# Patient Record
Sex: FEMALE | Race: WHITE | Employment: OTHER | URBAN - METROPOLITAN AREA
[De-identification: names, ages, dates, MRNs, and addresses within clinical notes are randomized per-mention and may not be internally consistent; named-entity substitution may affect disease eponyms.]

---

## 2019-08-28 PROBLEM — I63.9 CEREBROVASCULAR ACCIDENT (CVA), UNSPECIFIED MECHANISM (HCC): Status: ACTIVE | Noted: 2019-08-28

## 2019-08-28 PROCEDURE — 85613 RUSSELL VIPER VENOM DILUTED: CPT | Performed by: INTERNAL MEDICINE

## 2019-08-28 PROCEDURE — 85705 THROMBOPLASTIN INHIBITION: CPT | Performed by: INTERNAL MEDICINE

## 2019-08-28 PROCEDURE — 85610 PROTHROMBIN TIME: CPT | Performed by: INTERNAL MEDICINE

## 2019-08-28 PROCEDURE — 86803 HEPATITIS C AB TEST: CPT | Performed by: INTERNAL MEDICINE

## 2019-08-28 PROCEDURE — 86704 HEP B CORE ANTIBODY TOTAL: CPT | Performed by: INTERNAL MEDICINE

## 2019-08-28 PROCEDURE — 86705 HEP B CORE ANTIBODY IGM: CPT | Performed by: INTERNAL MEDICINE

## 2019-08-28 PROCEDURE — 85732 THROMBOPLASTIN TIME PARTIAL: CPT | Performed by: INTERNAL MEDICINE

## 2019-11-11 PROBLEM — M05.79 RHEUMATOID ARTHRITIS INVOLVING MULTIPLE SITES WITH POSITIVE RHEUMATOID FACTOR (HCC): Status: ACTIVE | Noted: 2019-11-11

## 2020-02-14 PROBLEM — Z87.59 HISTORY OF MISCARRIAGE: Status: ACTIVE | Noted: 2020-02-14

## 2020-02-14 PROBLEM — Z72.0 TOBACCO USE: Status: ACTIVE | Noted: 2020-02-14

## 2020-02-25 ENCOUNTER — APPOINTMENT (OUTPATIENT)
Dept: GENERAL RADIOLOGY | Facility: HOSPITAL | Age: 75
End: 2020-02-25
Attending: EMERGENCY MEDICINE
Payer: COMMERCIAL

## 2020-02-25 ENCOUNTER — HOSPITAL ENCOUNTER (EMERGENCY)
Facility: HOSPITAL | Age: 75
Discharge: HOME | End: 2020-02-25
Attending: EMERGENCY MEDICINE
Payer: COMMERCIAL

## 2020-02-25 ENCOUNTER — APPOINTMENT (OUTPATIENT)
Dept: CT IMAGING | Facility: HOSPITAL | Age: 75
End: 2020-02-25
Attending: EMERGENCY MEDICINE
Payer: COMMERCIAL

## 2020-02-25 VITALS
OXYGEN SATURATION: 95 % | TEMPERATURE: 98 F | RESPIRATION RATE: 18 BRPM | DIASTOLIC BLOOD PRESSURE: 85 MMHG | WEIGHT: 185 LBS | SYSTOLIC BLOOD PRESSURE: 176 MMHG | BODY MASS INDEX: 32.78 KG/M2 | HEIGHT: 63 IN | HEART RATE: 88 BPM

## 2020-02-25 DIAGNOSIS — R07.89 CHEST WALL PAIN: ICD-10-CM
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
## 2020-02-25 DIAGNOSIS — S80.812A ABRASION OF LEFT LOWER EXTREMITY, INITIAL ENCOUNTER: ICD-10-CM

## 2020-02-25 PROCEDURE — 71045 X-RAY EXAM CHEST 1 VIEW: CPT | Performed by: EMERGENCY MEDICINE

## 2020-02-25 PROCEDURE — 70450 CT HEAD/BRAIN W/O DYE: CPT | Performed by: EMERGENCY MEDICINE

## 2020-02-25 PROCEDURE — 99284 EMERGENCY DEPT VISIT MOD MDM: CPT | Performed by: EMERGENCY MEDICINE

## 2020-02-25 PROCEDURE — 73590 X-RAY EXAM OF LOWER LEG: CPT | Performed by: EMERGENCY MEDICINE

## 2020-02-25 PROCEDURE — 72125 CT NECK SPINE W/O DYE: CPT | Performed by: EMERGENCY MEDICINE

## 2020-02-25 RX ORDER — ACETAMINOPHEN 500 MG
1000 TABLET ORAL ONCE
Status: COMPLETED | OUTPATIENT
Start: 2020-02-25 | End: 2020-02-25

## 2020-02-25 RX ORDER — HYDROCODONE BITARTRATE AND ACETAMINOPHEN 5; 325 MG/1; MG/1
2 TABLET ORAL ONCE
Status: DISCONTINUED | OUTPATIENT
Start: 2020-02-25 | End: 2020-02-25
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
## 2020-02-28 PROBLEM — Z87.59 HISTORY OF MISCARRIAGE: Status: RESOLVED | Noted: 2020-02-14 | Resolved: 2020-02-28

## 2020-08-13 PROBLEM — G89.29 CHRONIC BILATERAL LOW BACK PAIN WITH BILATERAL SCIATICA: Status: ACTIVE | Noted: 2020-01-01

## 2020-08-13 PROBLEM — M54.41 CHRONIC BILATERAL LOW BACK PAIN WITH BILATERAL SCIATICA: Status: ACTIVE | Noted: 2020-01-01

## 2020-08-13 PROBLEM — M54.42 CHRONIC BILATERAL LOW BACK PAIN WITH BILATERAL SCIATICA: Status: ACTIVE | Noted: 2020-01-01

## 2020-08-13 PROBLEM — M48.062 NEUROGENIC CLAUDICATION DUE TO LUMBAR SPINAL STENOSIS: Status: ACTIVE | Noted: 2020-01-01

## 2020-08-28 PROBLEM — J44.9 CHRONIC OBSTRUCTIVE PULMONARY DISEASE, UNSPECIFIED COPD TYPE (HCC): Status: ACTIVE | Noted: 2020-01-01

## 2020-08-28 PROBLEM — I71.4 ABDOMINAL AORTIC ANEURYSM (AAA) WITHOUT RUPTURE (HCC): Status: ACTIVE | Noted: 2020-01-01

## 2020-08-28 PROBLEM — M47.812 CERVICAL SPONDYLOSIS: Status: ACTIVE | Noted: 2020-01-01

## 2020-08-28 PROBLEM — I71.40 ABDOMINAL AORTIC ANEURYSM (AAA) WITHOUT RUPTURE (HCC): Status: ACTIVE | Noted: 2020-01-01

## 2020-12-14 PROBLEM — I10 BENIGN ESSENTIAL HYPERTENSION: Status: ACTIVE | Noted: 2020-01-01

## 2020-12-14 PROBLEM — E78.2 MIXED HYPERLIPIDEMIA: Status: ACTIVE | Noted: 2018-07-16

## 2021-01-01 ENCOUNTER — APPOINTMENT (OUTPATIENT)
Dept: GENERAL RADIOLOGY | Facility: HOSPITAL | Age: 76
DRG: 682 | End: 2021-01-01
Attending: EMERGENCY MEDICINE
Payer: MEDICARE

## 2021-01-01 ENCOUNTER — APPOINTMENT (OUTPATIENT)
Dept: CT IMAGING | Facility: HOSPITAL | Age: 76
DRG: 377 | End: 2021-01-01
Attending: EMERGENCY MEDICINE
Payer: MEDICARE

## 2021-01-01 ENCOUNTER — HOSPITAL ENCOUNTER (EMERGENCY)
Facility: HOSPITAL | Age: 76
Discharge: HOME | End: 2021-01-01
Attending: EMERGENCY MEDICINE
Payer: MEDICARE

## 2021-01-01 ENCOUNTER — APPOINTMENT (OUTPATIENT)
Dept: GENERAL RADIOLOGY | Facility: HOSPITAL | Age: 76
DRG: 377 | End: 2021-01-01
Attending: INTERNAL MEDICINE
Payer: MEDICARE

## 2021-01-01 ENCOUNTER — HOSPITAL ENCOUNTER (OUTPATIENT)
Dept: CT IMAGING | Facility: HOSPITAL | Age: 76
Discharge: HOME | End: 2021-01-01
Attending: FAMILY MEDICINE
Payer: MEDICARE

## 2021-01-01 ENCOUNTER — APPOINTMENT (OUTPATIENT)
Dept: GENERAL RADIOLOGY | Facility: HOSPITAL | Age: 76
DRG: 377 | End: 2021-01-01
Attending: EMERGENCY MEDICINE
Payer: MEDICARE

## 2021-01-01 ENCOUNTER — TELEPHONE (OUTPATIENT)
Dept: CARDIAC REHAB | Facility: HOSPITAL | Age: 76
End: 2021-01-01

## 2021-01-01 ENCOUNTER — APPOINTMENT (OUTPATIENT)
Dept: CT IMAGING | Facility: HOSPITAL | Age: 76
DRG: 682 | End: 2021-01-01
Attending: EMERGENCY MEDICINE
Payer: MEDICARE

## 2021-01-01 ENCOUNTER — APPOINTMENT (OUTPATIENT)
Dept: CV DIAGNOSTICS | Facility: HOSPITAL | Age: 76
DRG: 682 | End: 2021-01-01
Attending: INTERNAL MEDICINE
Payer: MEDICARE

## 2021-01-01 ENCOUNTER — HOSPITAL ENCOUNTER (INPATIENT)
Facility: HOSPITAL | Age: 76
LOS: 10 days | Discharge: HOME HEALTH | DRG: 377 | End: 2021-01-01
Attending: EMERGENCY MEDICINE | Admitting: INTERNAL MEDICINE
Payer: MEDICARE

## 2021-01-01 ENCOUNTER — LAB ENCOUNTER (OUTPATIENT)
Dept: LAB | Facility: HOSPITAL | Age: 76
End: 2021-01-01
Attending: ORTHOPAEDIC SURGERY
Payer: MEDICARE

## 2021-01-01 ENCOUNTER — ANESTHESIA EVENT (OUTPATIENT)
Dept: ENDOSCOPY | Facility: HOSPITAL | Age: 76
DRG: 377 | End: 2021-01-01
Payer: MEDICARE

## 2021-01-01 ENCOUNTER — APPOINTMENT (OUTPATIENT)
Dept: GENERAL RADIOLOGY | Facility: HOSPITAL | Age: 76
End: 2021-01-01
Attending: EMERGENCY MEDICINE
Payer: MEDICARE

## 2021-01-01 ENCOUNTER — LAB REQUISITION (OUTPATIENT)
Dept: LAB | Facility: HOSPITAL | Age: 76
End: 2021-01-01
Payer: MEDICARE

## 2021-01-01 ENCOUNTER — HOSPITAL ENCOUNTER (OUTPATIENT)
Facility: HOSPITAL | Age: 76
Setting detail: OBSERVATION
Discharge: HOME | End: 2021-01-01
Attending: EMERGENCY MEDICINE | Admitting: HOSPITALIST
Payer: MEDICARE

## 2021-01-01 ENCOUNTER — APPOINTMENT (OUTPATIENT)
Dept: MRI IMAGING | Facility: HOSPITAL | Age: 76
DRG: 377 | End: 2021-01-01
Attending: INTERNAL MEDICINE
Payer: MEDICARE

## 2021-01-01 ENCOUNTER — APPOINTMENT (OUTPATIENT)
Dept: CV DIAGNOSTICS | Facility: HOSPITAL | Age: 76
DRG: 377 | End: 2021-01-01
Attending: PHYSICIAN ASSISTANT
Payer: MEDICARE

## 2021-01-01 ENCOUNTER — HOSPITAL ENCOUNTER (INPATIENT)
Facility: HOSPITAL | Age: 76
LOS: 4 days | Discharge: HOME HEALTH | DRG: 682 | End: 2021-01-01
Attending: EMERGENCY MEDICINE | Admitting: INTERNAL MEDICINE
Payer: MEDICARE

## 2021-01-01 ENCOUNTER — APPOINTMENT (OUTPATIENT)
Dept: CT IMAGING | Facility: HOSPITAL | Age: 76
End: 2021-01-01
Attending: EMERGENCY MEDICINE
Payer: MEDICARE

## 2021-01-01 ENCOUNTER — HOSPITAL ENCOUNTER (INPATIENT)
Facility: HOSPITAL | Age: 76
LOS: 6 days | Discharge: HOSPICE | DRG: 682 | End: 2021-01-01
Attending: EMERGENCY MEDICINE | Admitting: INTERNAL MEDICINE
Payer: MEDICARE

## 2021-01-01 ENCOUNTER — APPOINTMENT (OUTPATIENT)
Dept: ULTRASOUND IMAGING | Facility: HOSPITAL | Age: 76
DRG: 377 | End: 2021-01-01
Attending: INTERNAL MEDICINE
Payer: MEDICARE

## 2021-01-01 ENCOUNTER — APPOINTMENT (OUTPATIENT)
Dept: ULTRASOUND IMAGING | Facility: HOSPITAL | Age: 76
DRG: 682 | End: 2021-01-01
Attending: HOSPITALIST
Payer: MEDICARE

## 2021-01-01 ENCOUNTER — APPOINTMENT (OUTPATIENT)
Dept: INTERVENTIONAL RADIOLOGY/VASCULAR | Facility: HOSPITAL | Age: 76
DRG: 377 | End: 2021-01-01
Attending: INTERNAL MEDICINE
Payer: MEDICARE

## 2021-01-01 ENCOUNTER — APPOINTMENT (OUTPATIENT)
Dept: MRI IMAGING | Facility: HOSPITAL | Age: 76
End: 2021-01-01
Attending: ORTHOPAEDIC SURGERY
Payer: MEDICARE

## 2021-01-01 ENCOUNTER — LAB ENCOUNTER (OUTPATIENT)
Dept: LAB | Facility: HOSPITAL | Age: 76
End: 2021-01-01
Attending: FAMILY MEDICINE
Payer: MEDICARE

## 2021-01-01 ENCOUNTER — ANESTHESIA (OUTPATIENT)
Dept: ENDOSCOPY | Facility: HOSPITAL | Age: 76
DRG: 377 | End: 2021-01-01
Payer: MEDICARE

## 2021-01-01 VITALS
OXYGEN SATURATION: 95 % | DIASTOLIC BLOOD PRESSURE: 57 MMHG | BODY MASS INDEX: 38.21 KG/M2 | SYSTOLIC BLOOD PRESSURE: 135 MMHG | TEMPERATURE: 97 F | WEIGHT: 207.63 LBS | RESPIRATION RATE: 16 BRPM | HEIGHT: 62 IN | HEART RATE: 95 BPM

## 2021-01-01 VITALS
SYSTOLIC BLOOD PRESSURE: 142 MMHG | OXYGEN SATURATION: 97 % | HEART RATE: 78 BPM | HEIGHT: 63 IN | WEIGHT: 175 LBS | BODY MASS INDEX: 31.01 KG/M2 | RESPIRATION RATE: 25 BRPM | TEMPERATURE: 98 F | DIASTOLIC BLOOD PRESSURE: 76 MMHG

## 2021-01-01 VITALS
TEMPERATURE: 98 F | HEART RATE: 79 BPM | HEIGHT: 63 IN | DIASTOLIC BLOOD PRESSURE: 68 MMHG | WEIGHT: 206 LBS | RESPIRATION RATE: 18 BRPM | SYSTOLIC BLOOD PRESSURE: 135 MMHG | OXYGEN SATURATION: 95 % | BODY MASS INDEX: 36.5 KG/M2

## 2021-01-01 VITALS
HEIGHT: 62 IN | RESPIRATION RATE: 24 BRPM | OXYGEN SATURATION: 97 % | DIASTOLIC BLOOD PRESSURE: 56 MMHG | SYSTOLIC BLOOD PRESSURE: 154 MMHG | BODY MASS INDEX: 33.13 KG/M2 | TEMPERATURE: 98 F | WEIGHT: 180 LBS | HEART RATE: 85 BPM

## 2021-01-01 VITALS
BODY MASS INDEX: 34.2 KG/M2 | TEMPERATURE: 98 F | OXYGEN SATURATION: 100 % | WEIGHT: 193 LBS | HEIGHT: 63 IN | DIASTOLIC BLOOD PRESSURE: 62 MMHG | SYSTOLIC BLOOD PRESSURE: 97 MMHG | RESPIRATION RATE: 19 BRPM | HEART RATE: 92 BPM

## 2021-01-01 DIAGNOSIS — D64.9 ANEMIA, UNSPECIFIED TYPE: ICD-10-CM

## 2021-01-01 DIAGNOSIS — K92.2 GASTROINTESTINAL HEMORRHAGE, UNSPECIFIED GASTROINTESTINAL HEMORRHAGE TYPE: Primary | ICD-10-CM

## 2021-01-01 DIAGNOSIS — R10.84 ABDOMINAL PAIN, GENERALIZED: ICD-10-CM

## 2021-01-01 DIAGNOSIS — E87.1 HYPONATREMIA: Primary | ICD-10-CM

## 2021-01-01 DIAGNOSIS — S32.010A CLOSED COMPRESSION FRACTURE OF BODY OF L1 VERTEBRA (HCC): ICD-10-CM

## 2021-01-01 DIAGNOSIS — R53.1 LEFT-SIDED WEAKNESS: ICD-10-CM

## 2021-01-01 DIAGNOSIS — D69.6 THROMBOCYTOPENIA (HCC): ICD-10-CM

## 2021-01-01 DIAGNOSIS — R19.7 DIARRHEA, UNSPECIFIED TYPE: Primary | ICD-10-CM

## 2021-01-01 DIAGNOSIS — T83.511A URINARY TRACT INFECTION ASSOCIATED WITH INDWELLING URETHRAL CATHETER, INITIAL ENCOUNTER (HCC): ICD-10-CM

## 2021-01-01 DIAGNOSIS — R77.8 ELEVATED TROPONIN: ICD-10-CM

## 2021-01-01 DIAGNOSIS — N28.9 ACUTE RENAL INSUFFICIENCY: Primary | ICD-10-CM

## 2021-01-01 DIAGNOSIS — N39.0 URINARY TRACT INFECTION ASSOCIATED WITH INDWELLING URETHRAL CATHETER, INITIAL ENCOUNTER (HCC): ICD-10-CM

## 2021-01-01 DIAGNOSIS — R55 SYNCOPE AND COLLAPSE: ICD-10-CM

## 2021-01-01 DIAGNOSIS — E87.2 LACTIC ACIDOSIS: ICD-10-CM

## 2021-01-01 DIAGNOSIS — I21.4 NON-STEMI (NON-ST ELEVATED MYOCARDIAL INFARCTION) (HCC): ICD-10-CM

## 2021-01-01 DIAGNOSIS — N28.0 RENAL INFARCT (HCC): Primary | ICD-10-CM

## 2021-01-01 DIAGNOSIS — R47.81 SLURRED SPEECH: ICD-10-CM

## 2021-01-01 DIAGNOSIS — I25.10 ATHEROSCLEROTIC HEART DISEASE OF NATIVE CORONARY ARTERY WITHOUT ANGINA PECTORIS: ICD-10-CM

## 2021-01-01 DIAGNOSIS — N28.9 RENAL INSUFFICIENCY: ICD-10-CM

## 2021-01-01 DIAGNOSIS — R22.2 CHEST MASS: ICD-10-CM

## 2021-01-01 DIAGNOSIS — Z01.818 PREOP TESTING: ICD-10-CM

## 2021-01-01 DIAGNOSIS — N17.9 AKI (ACUTE KIDNEY INJURY) (HCC): ICD-10-CM

## 2021-01-01 DIAGNOSIS — E87.1 HYPONATREMIA: ICD-10-CM

## 2021-01-01 LAB
A1AT SERPL-MCNC: 229 MG/DL (ref 90–200)
ALBUMIN SERPL ELPH-MCNC: 3.28 G/DL (ref 3.75–5.21)
ALBUMIN SERPL-MCNC: 2.8 G/DL (ref 3.4–5)
ALBUMIN SERPL-MCNC: 3.1 G/DL (ref 3.4–5)
ALBUMIN SERPL-MCNC: 3.2 G/DL (ref 3.4–5)
ALBUMIN SERPL-MCNC: 3.2 G/DL (ref 3.4–5)
ALBUMIN SERPL-MCNC: 3.4 G/DL (ref 3.4–5)
ALBUMIN SERPL-MCNC: 3.5 G/DL (ref 3.4–5)
ALBUMIN SERPL-MCNC: 3.6 G/DL (ref 3.4–5)
ALBUMIN SERPL-MCNC: 3.7 G/DL (ref 3.4–5)
ALBUMIN SERPL-MCNC: 3.8 G/DL (ref 3.4–5)
ALBUMIN SERPL-MCNC: 4 G/DL (ref 3.4–5)
ALBUMIN SERPL-MCNC: 4.2 G/DL (ref 3.4–5)
ALBUMIN SERPL-MCNC: 4.2 G/DL (ref 3.4–5)
ALBUMIN/GLOB SERPL: 0.7 {RATIO} (ref 1–2)
ALBUMIN/GLOB SERPL: 0.7 {RATIO} (ref 1–2)
ALBUMIN/GLOB SERPL: 0.8 {RATIO} (ref 1–2)
ALBUMIN/GLOB SERPL: 0.9 {RATIO} (ref 1–2)
ALBUMIN/GLOB SERPL: 0.96 {RATIO} (ref 1–2)
ALBUMIN/GLOB SERPL: 1.2 {RATIO} (ref 1–2)
ALBUMIN/GLOB SERPL: 1.3 {RATIO} (ref 1–2)
ALBUMIN/GLOB SERPL: 1.3 {RATIO} (ref 1–2)
ALP LIVER SERPL-CCNC: 100 U/L
ALP LIVER SERPL-CCNC: 102 U/L
ALP LIVER SERPL-CCNC: 104 U/L
ALP LIVER SERPL-CCNC: 105 U/L
ALP LIVER SERPL-CCNC: 106 U/L
ALP LIVER SERPL-CCNC: 116 U/L
ALP LIVER SERPL-CCNC: 91 U/L
ALP LIVER SERPL-CCNC: 95 U/L
ALP LIVER SERPL-CCNC: 96 U/L
ALPHA1 GLOB SERPL ELPH-MCNC: 0.43 G/DL (ref 0.19–0.46)
ALPHA2 GLOB SERPL ELPH-MCNC: 0.67 G/DL (ref 0.48–1.05)
ALT SERPL-CCNC: 101 U/L
ALT SERPL-CCNC: 123 U/L
ALT SERPL-CCNC: 127 U/L
ALT SERPL-CCNC: 130 U/L
ALT SERPL-CCNC: 156 U/L
ALT SERPL-CCNC: 156 U/L
ALT SERPL-CCNC: 194 U/L
ALT SERPL-CCNC: 227 U/L
ALT SERPL-CCNC: 25 U/L
ANION GAP SERPL CALC-SCNC: 10 MMOL/L (ref 0–18)
ANION GAP SERPL CALC-SCNC: 10 MMOL/L (ref 0–18)
ANION GAP SERPL CALC-SCNC: 11 MMOL/L (ref 0–18)
ANION GAP SERPL CALC-SCNC: 12 MMOL/L (ref 0–18)
ANION GAP SERPL CALC-SCNC: 13 MMOL/L (ref 0–18)
ANION GAP SERPL CALC-SCNC: 14 MMOL/L (ref 0–18)
ANION GAP SERPL CALC-SCNC: 7 MMOL/L (ref 0–18)
ANION GAP SERPL CALC-SCNC: 7 MMOL/L (ref 0–18)
ANION GAP SERPL CALC-SCNC: 9 MMOL/L (ref 0–18)
ANTIBODY SCREEN: NEGATIVE
AST SERPL-CCNC: 163 U/L (ref 15–37)
AST SERPL-CCNC: 181 U/L (ref 15–37)
AST SERPL-CCNC: 182 U/L (ref 15–37)
AST SERPL-CCNC: 190 U/L (ref 15–37)
AST SERPL-CCNC: 202 U/L (ref 15–37)
AST SERPL-CCNC: 280 U/L (ref 15–37)
AST SERPL-CCNC: 301 U/L (ref 15–37)
AST SERPL-CCNC: 39 U/L (ref 15–37)
AST SERPL-CCNC: 442 U/L (ref 15–37)
ATRIAL RATE: 73 BPM
ATRIAL RATE: 79 BPM
B-GLOBULIN SERPL ELPH-MCNC: 0.9 G/DL (ref 0.68–1.23)
BASOPHILS # BLD AUTO: 0 X10(3) UL (ref 0–0.2)
BASOPHILS # BLD AUTO: 0.01 X10(3) UL (ref 0–0.2)
BASOPHILS # BLD AUTO: 0.02 X10(3) UL (ref 0–0.2)
BASOPHILS # BLD AUTO: 0.12 X10(3) UL (ref 0–0.2)
BASOPHILS NFR BLD AUTO: 0 %
BASOPHILS NFR BLD AUTO: 0.1 %
BASOPHILS NFR BLD AUTO: 0.1 %
BASOPHILS NFR BLD AUTO: 0.2 %
BASOPHILS NFR BLD AUTO: 0.2 %
BASOPHILS NFR BLD AUTO: 0.3 %
BASOPHILS NFR BLD AUTO: 1.5 %
BILIRUB DIRECT SERPL-MCNC: 1.6 MG/DL (ref 0–0.2)
BILIRUB DIRECT SERPL-MCNC: 1.8 MG/DL (ref 0–0.2)
BILIRUB SERPL-MCNC: 1.1 MG/DL (ref 0.1–2)
BILIRUB SERPL-MCNC: 1.9 MG/DL (ref 0.1–2)
BILIRUB SERPL-MCNC: 2 MG/DL (ref 0.1–2)
BILIRUB SERPL-MCNC: 2.1 MG/DL (ref 0.1–2)
BILIRUB SERPL-MCNC: 2.3 MG/DL (ref 0.1–2)
BILIRUB SERPL-MCNC: 2.7 MG/DL (ref 0.1–2)
BILIRUB SERPL-MCNC: 2.9 MG/DL (ref 0.1–2)
BILIRUB SERPL-MCNC: 2.9 MG/DL (ref 0.1–2)
BILIRUB SERPL-MCNC: 3.2 MG/DL (ref 0.1–2)
BILIRUB UR QL STRIP.AUTO: NEGATIVE
BILIRUB UR QL STRIP.AUTO: NEGATIVE
BUN BLD-MCNC: 13 MG/DL (ref 7–18)
BUN BLD-MCNC: 39 MG/DL (ref 7–18)
BUN BLD-MCNC: 41 MG/DL (ref 7–18)
BUN BLD-MCNC: 45 MG/DL (ref 7–18)
BUN BLD-MCNC: 50 MG/DL (ref 7–18)
BUN BLD-MCNC: 52 MG/DL (ref 7–18)
BUN BLD-MCNC: 57 MG/DL (ref 7–18)
BUN BLD-MCNC: 65 MG/DL (ref 7–18)
BUN BLD-MCNC: 66 MG/DL (ref 7–18)
BUN BLD-MCNC: 66 MG/DL (ref 7–18)
BUN BLD-MCNC: 67 MG/DL (ref 7–18)
BUN/CREAT SERPL: 11.1 (ref 10–20)
BUN/CREAT SERPL: 16.1 (ref 10–20)
BUN/CREAT SERPL: 17.8 (ref 10–20)
BUN/CREAT SERPL: 18 (ref 10–20)
BUN/CREAT SERPL: 18 (ref 10–20)
BUN/CREAT SERPL: 19.3 (ref 10–20)
BUN/CREAT SERPL: 19.4 (ref 10–20)
BUN/CREAT SERPL: 20.5 (ref 10–20)
CALCIUM BLD-MCNC: 8.7 MG/DL (ref 8.5–10.1)
CALCIUM BLD-MCNC: 8.8 MG/DL (ref 8.5–10.1)
CALCIUM BLD-MCNC: 9 MG/DL (ref 8.5–10.1)
CALCIUM BLD-MCNC: 9.1 MG/DL (ref 8.5–10.1)
CALCIUM BLD-MCNC: 9.1 MG/DL (ref 8.5–10.1)
CALCIUM BLD-MCNC: 9.2 MG/DL (ref 8.5–10.1)
CALCIUM BLD-MCNC: 9.3 MG/DL (ref 8.5–10.1)
CALCIUM BLD-MCNC: 9.3 MG/DL (ref 8.5–10.1)
CALCIUM BLD-MCNC: 9.4 MG/DL (ref 8.5–10.1)
CERULOPLASMIN SERPL-MCNC: 47.4 MG/DL (ref 20–60)
CHLORIDE SERPL-SCNC: 100 MMOL/L (ref 98–112)
CHLORIDE SERPL-SCNC: 94 MMOL/L (ref 98–112)
CHLORIDE SERPL-SCNC: 95 MMOL/L (ref 98–112)
CHLORIDE SERPL-SCNC: 96 MMOL/L (ref 98–112)
CHLORIDE SERPL-SCNC: 97 MMOL/L (ref 98–112)
CHLORIDE SERPL-SCNC: 98 MMOL/L (ref 98–112)
CO2 SERPL-SCNC: 22 MMOL/L (ref 21–32)
CO2 SERPL-SCNC: 24 MMOL/L (ref 21–32)
CO2 SERPL-SCNC: 25 MMOL/L (ref 21–32)
CO2 SERPL-SCNC: 26 MMOL/L (ref 21–32)
CO2 SERPL-SCNC: 27 MMOL/L (ref 21–32)
CO2 SERPL-SCNC: 28 MMOL/L (ref 21–32)
COLOR UR AUTO: YELLOW
CREAT BLD-MCNC: 1.17 MG/DL
CREAT BLD-MCNC: 2.28 MG/DL
CREAT BLD-MCNC: 2.42 MG/DL
CREAT BLD-MCNC: 2.44 MG/DL
CREAT BLD-MCNC: 2.5 MG/DL
CREAT BLD-MCNC: 2.68 MG/DL
CREAT BLD-MCNC: 2.95 MG/DL
CREAT BLD-MCNC: 3.65 MG/DL
CREAT BLD-MCNC: 3.93 MG/DL
CREAT BLD-MCNC: 4.02 MG/DL
CREAT BLD-MCNC: 4.04 MG/DL
CREAT UR-SCNC: 103 MG/DL
CREAT UR-SCNC: 74.1 MG/DL
CREAT UR-SCNC: 78.5 MG/DL
DEPRECATED HBV CORE AB SER IA-ACNC: 104.3 NG/ML
DEPRECATED RDW RBC AUTO: 67.9 FL (ref 35.1–46.3)
DEPRECATED RDW RBC AUTO: 68 FL (ref 35.1–46.3)
DEPRECATED RDW RBC AUTO: 69 FL (ref 35.1–46.3)
DEPRECATED RDW RBC AUTO: 69.5 FL (ref 35.1–46.3)
DEPRECATED RDW RBC AUTO: 70.1 FL (ref 35.1–46.3)
DEPRECATED RDW RBC AUTO: 70.2 FL (ref 35.1–46.3)
DEPRECATED RDW RBC AUTO: 70.4 FL (ref 35.1–46.3)
EOSINOPHIL # BLD AUTO: 0.01 X10(3) UL (ref 0–0.7)
EOSINOPHIL # BLD AUTO: 0.02 X10(3) UL (ref 0–0.7)
EOSINOPHIL # BLD AUTO: 0.03 X10(3) UL (ref 0–0.7)
EOSINOPHIL # BLD AUTO: 0.04 X10(3) UL (ref 0–0.7)
EOSINOPHIL # BLD AUTO: 0.04 X10(3) UL (ref 0–0.7)
EOSINOPHIL # BLD AUTO: 0.07 X10(3) UL (ref 0–0.7)
EOSINOPHIL # BLD AUTO: 0.09 X10(3) UL (ref 0–0.7)
EOSINOPHIL # BLD AUTO: 0.1 X10(3) UL (ref 0–0.7)
EOSINOPHIL # BLD AUTO: 0.37 X10(3) UL (ref 0–0.7)
EOSINOPHIL NFR BLD AUTO: 0.2 %
EOSINOPHIL NFR BLD AUTO: 0.3 %
EOSINOPHIL NFR BLD AUTO: 0.5 %
EOSINOPHIL NFR BLD AUTO: 0.5 %
EOSINOPHIL NFR BLD AUTO: 0.6 %
EOSINOPHIL NFR BLD AUTO: 1 %
EOSINOPHIL NFR BLD AUTO: 1.1 %
EOSINOPHIL NFR BLD AUTO: 1.4 %
EOSINOPHIL NFR BLD AUTO: 4.7 %
ERYTHROCYTE [DISTWIDTH] IN BLOOD BY AUTOMATED COUNT: 19.7 % (ref 11–15)
ERYTHROCYTE [DISTWIDTH] IN BLOOD BY AUTOMATED COUNT: 19.7 % (ref 11–15)
ERYTHROCYTE [DISTWIDTH] IN BLOOD BY AUTOMATED COUNT: 19.8 %
ERYTHROCYTE [DISTWIDTH] IN BLOOD BY AUTOMATED COUNT: 19.8 % (ref 11–15)
ERYTHROCYTE [DISTWIDTH] IN BLOOD BY AUTOMATED COUNT: 19.9 % (ref 11–15)
ERYTHROCYTE [DISTWIDTH] IN BLOOD BY AUTOMATED COUNT: 20 % (ref 11–15)
ERYTHROCYTE [DISTWIDTH] IN BLOOD BY AUTOMATED COUNT: 20.2 % (ref 11–15)
ERYTHROCYTE [DISTWIDTH] IN BLOOD BY AUTOMATED COUNT: 20.3 % (ref 11–15)
ERYTHROCYTE [DISTWIDTH] IN BLOOD BY AUTOMATED COUNT: 20.6 %
FOLATE SERPL-MCNC: 88.9 NG/ML (ref 8.7–?)
GAMMA GLOB SERPL ELPH-MCNC: 1.42 G/DL (ref 0.62–1.7)
GLOBULIN PLAS-MCNC: 2.9 G/DL (ref 2.8–4.4)
GLOBULIN PLAS-MCNC: 3 G/DL (ref 2.8–4.4)
GLOBULIN PLAS-MCNC: 3.2 G/DL (ref 2.8–4.4)
GLOBULIN PLAS-MCNC: 3.6 G/DL (ref 2.8–4.4)
GLOBULIN PLAS-MCNC: 3.8 G/DL (ref 2.8–4.4)
GLOBULIN PLAS-MCNC: 3.9 G/DL (ref 2.8–4.4)
GLOBULIN PLAS-MCNC: 4.3 G/DL (ref 2.8–4.4)
GLUCOSE BLD-MCNC: 100 MG/DL (ref 70–99)
GLUCOSE BLD-MCNC: 102 MG/DL (ref 70–99)
GLUCOSE BLD-MCNC: 103 MG/DL (ref 70–99)
GLUCOSE BLD-MCNC: 104 MG/DL (ref 70–99)
GLUCOSE BLD-MCNC: 108 MG/DL (ref 70–99)
GLUCOSE BLD-MCNC: 113 MG/DL (ref 70–99)
GLUCOSE BLD-MCNC: 115 MG/DL (ref 70–99)
GLUCOSE BLD-MCNC: 118 MG/DL (ref 70–99)
GLUCOSE BLD-MCNC: 121 MG/DL (ref 70–99)
GLUCOSE BLD-MCNC: 95 MG/DL (ref 70–99)
GLUCOSE BLD-MCNC: 99 MG/DL (ref 70–99)
GLUCOSE UR STRIP.AUTO-MCNC: NEGATIVE MG/DL
GLUCOSE UR STRIP.AUTO-MCNC: NEGATIVE MG/DL
HAPTOGLOB SERPL-MCNC: 141 MG/DL (ref 30–200)
HAV IGM SER QL: 2.2 MG/DL (ref 1.6–2.6)
HAV IGM SER QL: 2.4 MG/DL (ref 1.6–2.6)
HBV SURFACE AG SER-ACNC: <0.1 [IU]/L
HBV SURFACE AG SERPL QL IA: NONREACTIVE
HCT VFR BLD AUTO: 29.7 %
HCT VFR BLD AUTO: 29.9 %
HCT VFR BLD AUTO: 30.7 %
HCT VFR BLD AUTO: 31.3 %
HCT VFR BLD AUTO: 31.6 %
HCT VFR BLD AUTO: 31.7 %
HCT VFR BLD AUTO: 31.9 %
HCT VFR BLD AUTO: 33.9 %
HCT VFR BLD AUTO: 35 %
HCV AB SERPL QL IA: NONREACTIVE
HGB BLD-MCNC: 10.5 G/DL
HGB BLD-MCNC: 10.8 G/DL
HGB BLD-MCNC: 9.2 G/DL
HGB BLD-MCNC: 9.3 G/DL
HGB BLD-MCNC: 9.5 G/DL
HGB BLD-MCNC: 9.6 G/DL
HGB BLD-MCNC: 9.7 G/DL
HGB BLD-MCNC: 9.8 G/DL
HGB BLD-MCNC: 9.9 G/DL
HGB RETIC QN AUTO: 30.2 PG (ref 28.2–36.6)
HYALINE CASTS #/AREA URNS AUTO: PRESENT /LPF
IMM GRANULOCYTES # BLD AUTO: 0.02 X10(3) UL (ref 0–1)
IMM GRANULOCYTES # BLD AUTO: 0.03 X10(3) UL (ref 0–1)
IMM GRANULOCYTES # BLD AUTO: 0.05 X10(3) UL (ref 0–1)
IMM GRANULOCYTES NFR BLD: 0.3 %
IMM GRANULOCYTES NFR BLD: 0.4 %
IMM GRANULOCYTES NFR BLD: 0.5 %
IMM GRANULOCYTES NFR BLD: 0.6 %
IMM GRANULOCYTES NFR BLD: 0.6 %
IMM RETICS NFR: 0.31 RATIO (ref 0.1–0.3)
IRON SATURATION: 7 %
IRON SATURATION: 9 %
IRON SERPL-MCNC: 31 UG/DL
IRON SERPL-MCNC: 37 UG/DL
KAPPA LC FREE SER-MCNC: 12.74 MG/DL (ref 0.33–1.94)
KAPPA LC FREE/LAMBDA FREE SER NEPH: 2.37 {RATIO} (ref 0.26–1.65)
KETONES UR STRIP.AUTO-MCNC: NEGATIVE MG/DL
KETONES UR STRIP.AUTO-MCNC: NEGATIVE MG/DL
LAMBDA LC FREE SERPL-MCNC: 5.38 MG/DL (ref 0.57–2.63)
LEUKOCYTE ESTERASE UR QL STRIP.AUTO: NEGATIVE
LIPASE SERPL-CCNC: 71 U/L (ref 73–393)
LYMPHOCYTES # BLD AUTO: 0.75 X10(3) UL (ref 1–4)
LYMPHOCYTES # BLD AUTO: 0.81 X10(3) UL (ref 1–4)
LYMPHOCYTES # BLD AUTO: 1.08 X10(3) UL (ref 1–4)
LYMPHOCYTES # BLD AUTO: 1.09 X10(3) UL (ref 1–4)
LYMPHOCYTES # BLD AUTO: 1.11 X10(3) UL (ref 1–4)
LYMPHOCYTES # BLD AUTO: 1.23 X10(3) UL (ref 1–4)
LYMPHOCYTES # BLD AUTO: 1.33 X10(3) UL (ref 1–4)
LYMPHOCYTES # BLD AUTO: 1.34 X10(3) UL (ref 1–4)
LYMPHOCYTES # BLD AUTO: 1.42 X10(3) UL (ref 1–4)
LYMPHOCYTES NFR BLD AUTO: 13.3 %
LYMPHOCYTES NFR BLD AUTO: 13.9 %
LYMPHOCYTES NFR BLD AUTO: 14 %
LYMPHOCYTES NFR BLD AUTO: 14.7 %
LYMPHOCYTES NFR BLD AUTO: 15.7 %
LYMPHOCYTES NFR BLD AUTO: 16.7 %
LYMPHOCYTES NFR BLD AUTO: 17.2 %
LYMPHOCYTES NFR BLD AUTO: 17.8 %
LYMPHOCYTES NFR BLD AUTO: 19.8 %
M PROTEIN MFR SERPL ELPH: 6.6 G/DL (ref 6.4–8.2)
M PROTEIN MFR SERPL ELPH: 6.6 G/DL (ref 6.4–8.2)
M PROTEIN MFR SERPL ELPH: 6.7 G/DL (ref 6.4–8.2)
M PROTEIN MFR SERPL ELPH: 6.7 G/DL (ref 6.4–8.2)
M PROTEIN MFR SERPL ELPH: 7 G/DL (ref 6.4–8.2)
M PROTEIN MFR SERPL ELPH: 7 G/DL (ref 6.4–8.2)
M PROTEIN MFR SERPL ELPH: 7.1 G/DL (ref 6.4–8.2)
M PROTEIN MFR SERPL ELPH: 7.5 G/DL (ref 6.4–8.2)
MCH RBC QN AUTO: 28.8 PG (ref 26–34)
MCH RBC QN AUTO: 29.1 PG (ref 26–34)
MCH RBC QN AUTO: 29.2 PG (ref 26–34)
MCH RBC QN AUTO: 29.2 PG (ref 26–34)
MCH RBC QN AUTO: 29.3 PG (ref 26–34)
MCH RBC QN AUTO: 29.8 PG (ref 26–34)
MCH RBC QN AUTO: 29.8 PG (ref 26–34)
MCHC RBC AUTO-ENTMCNC: 30 G/DL (ref 31–37)
MCHC RBC AUTO-ENTMCNC: 30.3 G/DL (ref 31–37)
MCHC RBC AUTO-ENTMCNC: 30.4 G/DL (ref 31–37)
MCHC RBC AUTO-ENTMCNC: 30.7 G/DL (ref 31–37)
MCHC RBC AUTO-ENTMCNC: 30.9 G/DL (ref 31–37)
MCHC RBC AUTO-ENTMCNC: 31 G/DL (ref 31–37)
MCHC RBC AUTO-ENTMCNC: 31 G/DL (ref 31–37)
MCHC RBC AUTO-ENTMCNC: 31.6 G/DL (ref 31–37)
MCHC RBC AUTO-ENTMCNC: 32.4 G/DL (ref 31–37)
MCV RBC AUTO: 92 FL
MCV RBC AUTO: 92.6 FL
MCV RBC AUTO: 94.6 FL
MCV RBC AUTO: 94.9 FL
MCV RBC AUTO: 94.9 FL
MCV RBC AUTO: 95.9 FL
MCV RBC AUTO: 96 FL
MCV RBC AUTO: 96.1 FL
MCV RBC AUTO: 96.3 FL
MICROALBUMIN UR-MCNC: 18.1 MG/DL
MICROALBUMIN/CREAT 24H UR-RTO: 230.6 UG/MG (ref ?–30)
MONOCYTES # BLD AUTO: 0.49 X10(3) UL (ref 0.1–1)
MONOCYTES # BLD AUTO: 0.5 X10(3) UL (ref 0.1–1)
MONOCYTES # BLD AUTO: 0.52 X10(3) UL (ref 0.1–1)
MONOCYTES # BLD AUTO: 0.54 X10(3) UL (ref 0.1–1)
MONOCYTES # BLD AUTO: 0.56 X10(3) UL (ref 0.1–1)
MONOCYTES # BLD AUTO: 0.57 X10(3) UL (ref 0.1–1)
MONOCYTES # BLD AUTO: 0.59 X10(3) UL (ref 0.1–1)
MONOCYTES # BLD AUTO: 0.72 X10(3) UL (ref 0.1–1)
MONOCYTES # BLD AUTO: 0.73 X10(3) UL (ref 0.1–1)
MONOCYTES NFR BLD AUTO: 6.7 %
MONOCYTES NFR BLD AUTO: 7.5 %
MONOCYTES NFR BLD AUTO: 7.6 %
MONOCYTES NFR BLD AUTO: 7.6 %
MONOCYTES NFR BLD AUTO: 8.2 %
MONOCYTES NFR BLD AUTO: 9.1 %
MONOCYTES NFR BLD AUTO: 9.1 %
MONOCYTES NFR BLD AUTO: 9.2 %
MONOCYTES NFR BLD AUTO: 9.2 %
NEUTROPHILS # BLD AUTO: 4.13 X10 (3) UL (ref 1.5–7.7)
NEUTROPHILS # BLD AUTO: 4.13 X10(3) UL (ref 1.5–7.7)
NEUTROPHILS # BLD AUTO: 4.48 X10 (3) UL (ref 1.5–7.7)
NEUTROPHILS # BLD AUTO: 4.48 X10(3) UL (ref 1.5–7.7)
NEUTROPHILS # BLD AUTO: 4.72 X10 (3) UL (ref 1.5–7.7)
NEUTROPHILS # BLD AUTO: 4.72 X10(3) UL (ref 1.5–7.7)
NEUTROPHILS # BLD AUTO: 5.07 X10 (3) UL (ref 1.5–7.7)
NEUTROPHILS # BLD AUTO: 5.07 X10(3) UL (ref 1.5–7.7)
NEUTROPHILS # BLD AUTO: 5.2 X10 (3) UL (ref 1.5–7.7)
NEUTROPHILS # BLD AUTO: 5.2 X10(3) UL (ref 1.5–7.7)
NEUTROPHILS # BLD AUTO: 5.59 X10 (3) UL (ref 1.5–7.7)
NEUTROPHILS # BLD AUTO: 5.59 X10(3) UL (ref 1.5–7.7)
NEUTROPHILS # BLD AUTO: 5.77 X10 (3) UL (ref 1.5–7.7)
NEUTROPHILS # BLD AUTO: 5.77 X10(3) UL (ref 1.5–7.7)
NEUTROPHILS # BLD AUTO: 5.99 X10 (3) UL (ref 1.5–7.7)
NEUTROPHILS # BLD AUTO: 5.99 X10(3) UL (ref 1.5–7.7)
NEUTROPHILS # BLD AUTO: 6.04 X10 (3) UL (ref 1.5–7.7)
NEUTROPHILS # BLD AUTO: 6.04 X10(3) UL (ref 1.5–7.7)
NEUTROPHILS NFR BLD AUTO: 66.8 %
NEUTROPHILS NFR BLD AUTO: 70.7 %
NEUTROPHILS NFR BLD AUTO: 71.8 %
NEUTROPHILS NFR BLD AUTO: 72.6 %
NEUTROPHILS NFR BLD AUTO: 76.1 %
NEUTROPHILS NFR BLD AUTO: 76.2 %
NEUTROPHILS NFR BLD AUTO: 76.7 %
NEUTROPHILS NFR BLD AUTO: 77.4 %
NEUTROPHILS NFR BLD AUTO: 77.5 %
NITRITE UR QL STRIP.AUTO: NEGATIVE
NITRITE UR QL STRIP.AUTO: NEGATIVE
NT-PROBNP SERPL-MCNC: ABNORMAL PG/ML (ref ?–450)
OSMOLALITY SERPL CALC.SUM OF ELEC: 278 MOSM/KG (ref 275–295)
OSMOLALITY SERPL CALC.SUM OF ELEC: 280 MOSM/KG (ref 275–295)
OSMOLALITY SERPL CALC.SUM OF ELEC: 281 MOSM/KG (ref 275–295)
OSMOLALITY SERPL CALC.SUM OF ELEC: 282 MOSM/KG (ref 275–295)
OSMOLALITY SERPL CALC.SUM OF ELEC: 288 MOSM/KG (ref 275–295)
OSMOLALITY SERPL CALC.SUM OF ELEC: 291 MOSM/KG (ref 275–295)
OSMOLALITY SERPL CALC.SUM OF ELEC: 292 MOSM/KG (ref 275–295)
OSMOLALITY SERPL CALC.SUM OF ELEC: 293 MOSM/KG (ref 275–295)
OSMOLALITY SERPL CALC.SUM OF ELEC: 293 MOSM/KG (ref 275–295)
OSMOLALITY SERPL CALC.SUM OF ELEC: 294 MOSM/KG (ref 275–295)
OSMOLALITY SERPL CALC.SUM OF ELEC: 297 MOSM/KG (ref 275–295)
OSMOLALITY SERPL: 289 MOSM/KG (ref 280–300)
OSMOLALITY UR: 397 MOSM/KG (ref 300–1300)
P AXIS: 32 DEGREES
P AXIS: 59 DEGREES
P-R INTERVAL: 156 MS
P-R INTERVAL: 158 MS
PH UR STRIP.AUTO: 5 [PH] (ref 5–8)
PH UR STRIP.AUTO: 6 [PH] (ref 5–8)
PHOSPHATE SERPL-MCNC: 3.7 MG/DL (ref 2.5–4.9)
PHOSPHATE SERPL-MCNC: 4.3 MG/DL (ref 2.5–4.9)
PHOSPHATE SERPL-MCNC: 5 MG/DL (ref 2.5–4.9)
PHOSPHATE SERPL-MCNC: 5.1 MG/DL (ref 2.5–4.9)
PHOSPHATE SERPL-MCNC: 5.1 MG/DL (ref 2.5–4.9)
PHOSPHATE SERPL-MCNC: 5.4 MG/DL (ref 2.5–4.9)
PLATELET # BLD AUTO: 22 10(3)UL (ref 150–450)
PLATELET # BLD AUTO: 25 10(3)UL (ref 150–450)
PLATELET # BLD AUTO: 29 10(3)UL (ref 150–450)
PLATELET # BLD AUTO: 36 10(3)UL (ref 150–450)
PLATELET # BLD AUTO: 42 10(3)UL (ref 150–450)
PLATELET # BLD AUTO: 44 10(3)UL (ref 150–450)
PLATELET # BLD AUTO: 47 10(3)UL (ref 150–450)
PLATELET # BLD AUTO: 51 10(3)UL (ref 150–450)
PLATELET # BLD AUTO: 84 10(3)UL (ref 150–450)
PLATELET MORPHOLOGY: NORMAL
PLATELET MORPHOLOGY: NORMAL
POTASSIUM SERPL-SCNC: 3.2 MMOL/L (ref 3.5–5.1)
POTASSIUM SERPL-SCNC: 3.3 MMOL/L (ref 3.5–5.1)
POTASSIUM SERPL-SCNC: 3.4 MMOL/L (ref 3.5–5.1)
POTASSIUM SERPL-SCNC: 3.4 MMOL/L (ref 3.5–5.1)
POTASSIUM SERPL-SCNC: 3.7 MMOL/L (ref 3.5–5.1)
POTASSIUM SERPL-SCNC: 3.7 MMOL/L (ref 3.5–5.1)
POTASSIUM SERPL-SCNC: 3.8 MMOL/L (ref 3.5–5.1)
POTASSIUM SERPL-SCNC: 3.9 MMOL/L (ref 3.5–5.1)
POTASSIUM SERPL-SCNC: 4.1 MMOL/L (ref 3.5–5.1)
POTASSIUM SERPL-SCNC: 4.1 MMOL/L (ref 3.5–5.1)
POTASSIUM SERPL-SCNC: 4.5 MMOL/L (ref 3.5–5.1)
POTASSIUM SERPL-SCNC: 4.7 MMOL/L (ref 3.5–5.1)
PROT UR STRIP.AUTO-MCNC: 100 MG/DL
PROT UR STRIP.AUTO-MCNC: >=500 MG/DL
PROT UR-MCNC: 47.8 MG/DL
PROT UR-MCNC: 88 MG/DL
PROT/CREAT UR-RTO: 0.65
Q-T INTERVAL: 374 MS
Q-T INTERVAL: 402 MS
QRS DURATION: 102 MS
QRS DURATION: 88 MS
QTC CALCULATION (BEZET): 428 MS
QTC CALCULATION (BEZET): 442 MS
R AXIS: -36 DEGREES
R AXIS: -48 DEGREES
RBC # BLD AUTO: 3.14 X10(6)UL
RBC # BLD AUTO: 3.2 X10(6)UL
RBC # BLD AUTO: 3.25 X10(6)UL
RBC # BLD AUTO: 3.29 X10(6)UL
RBC # BLD AUTO: 3.3 X10(6)UL
RBC # BLD AUTO: 3.36 X10(6)UL
RBC # BLD AUTO: 3.38 X10(6)UL
RBC # BLD AUTO: 3.52 X10(6)UL
RBC # BLD AUTO: 3.69 X10(6)UL
RBC #/AREA URNS AUTO: >10 /HPF
RBC UR QL AUTO: NEGATIVE
RETICS # AUTO: 130.7 X10(3) UL (ref 22.5–147.5)
RETICS/RBC NFR AUTO: 4 %
RH BLOOD TYPE: POSITIVE
SARS-COV-2 RNA RESP QL NAA+PROBE: NOT DETECTED
SODIUM SERPL-SCNC: 129 MMOL/L (ref 136–145)
SODIUM SERPL-SCNC: 130 MMOL/L (ref 136–145)
SODIUM SERPL-SCNC: 130 MMOL/L (ref 136–145)
SODIUM SERPL-SCNC: 131 MMOL/L (ref 136–145)
SODIUM SERPL-SCNC: 132 MMOL/L (ref 136–145)
SODIUM SERPL-SCNC: 133 MMOL/L (ref 136–145)
SODIUM SERPL-SCNC: 134 MMOL/L (ref 136–145)
SODIUM SERPL-SCNC: 134 MMOL/L (ref 136–145)
SODIUM SERPL-SCNC: 135 MMOL/L (ref 136–145)
SODIUM SERPL-SCNC: 8 MMOL/L
SP GR UR STRIP.AUTO: 1.02 (ref 1–1.03)
SP GR UR STRIP.AUTO: 1.02 (ref 1–1.03)
T AXIS: 104 DEGREES
T AXIS: 16 DEGREES
TOTAL IRON BINDING CAPACITY: 426 UG/DL (ref 240–450)
TOTAL IRON BINDING CAPACITY: 429 UG/DL (ref 240–450)
TRANSFERRIN SERPL-MCNC: 286 MG/DL (ref 200–360)
TRANSFERRIN SERPL-MCNC: 288 MG/DL (ref 200–360)
TSI SER-ACNC: 2.1 MIU/ML (ref 0.36–3.74)
UROBILINOGEN UR STRIP.AUTO-MCNC: 2 MG/DL
UROBILINOGEN UR STRIP.AUTO-MCNC: <2 MG/DL
UUN UR-MCNC: 619 MG/DL
VENTRICULAR RATE: 73 BPM
VENTRICULAR RATE: 79 BPM
VIT B12 SERPL-MCNC: 1990 PG/ML (ref 193–986)
WBC # BLD AUTO: 5.4 X10(3) UL (ref 4–11)
WBC # BLD AUTO: 6.1 X10(3) UL (ref 4–11)
WBC # BLD AUTO: 6.2 X10(3) UL (ref 4–11)
WBC # BLD AUTO: 7.2 X10(3) UL (ref 4–11)
WBC # BLD AUTO: 7.3 X10(3) UL (ref 4–11)
WBC # BLD AUTO: 7.8 X10(3) UL (ref 4–11)
WBC # BLD AUTO: 8 X10(3) UL (ref 4–11)
WBC #/AREA URNS AUTO: >50 /HPF
WBC CLUMPS UR QL AUTO: PRESENT /HPF
WBC CLUMPS UR QL AUTO: PRESENT /HPF
YEAST URINE: PRESENT /HPF

## 2021-01-01 PROCEDURE — 85027 COMPLETE CBC AUTOMATED: CPT | Performed by: FAMILY MEDICINE

## 2021-01-01 PROCEDURE — 99285 EMERGENCY DEPT VISIT HI MDM: CPT

## 2021-01-01 PROCEDURE — 83935 ASSAY OF URINE OSMOLALITY: CPT | Performed by: INTERNAL MEDICINE

## 2021-01-01 PROCEDURE — 82570 ASSAY OF URINE CREATININE: CPT | Performed by: INTERNAL MEDICINE

## 2021-01-01 PROCEDURE — 0D598ZZ DESTRUCTION OF DUODENUM, VIA NATURAL OR ARTIFICIAL OPENING ENDOSCOPIC: ICD-10-PCS | Performed by: INTERNAL MEDICINE

## 2021-01-01 PROCEDURE — 83735 ASSAY OF MAGNESIUM: CPT | Performed by: INTERNAL MEDICINE

## 2021-01-01 PROCEDURE — P9047 ALBUMIN (HUMAN), 25%, 50ML: HCPCS | Performed by: INTERNAL MEDICINE

## 2021-01-01 PROCEDURE — B543ZZA ULTRASONOGRAPHY OF RIGHT JUGULAR VEINS, GUIDANCE: ICD-10-PCS | Performed by: INTERNAL MEDICINE

## 2021-01-01 PROCEDURE — 74177 CT ABD & PELVIS W/CONTRAST: CPT | Performed by: EMERGENCY MEDICINE

## 2021-01-01 PROCEDURE — 96372 THER/PROPH/DIAG INJ SC/IM: CPT

## 2021-01-01 PROCEDURE — 86900 BLOOD TYPING SEROLOGIC ABO: CPT

## 2021-01-01 PROCEDURE — 85007 BL SMEAR W/DIFF WBC COUNT: CPT | Performed by: FAMILY MEDICINE

## 2021-01-01 PROCEDURE — 96365 THER/PROPH/DIAG IV INF INIT: CPT

## 2021-01-01 PROCEDURE — 83935 ASSAY OF URINE OSMOLALITY: CPT | Performed by: HOSPITALIST

## 2021-01-01 PROCEDURE — 86850 RBC ANTIBODY SCREEN: CPT

## 2021-01-01 PROCEDURE — 85025 COMPLETE CBC W/AUTO DIFF WBC: CPT | Performed by: EMERGENCY MEDICINE

## 2021-01-01 PROCEDURE — 85025 COMPLETE CBC W/AUTO DIFF WBC: CPT | Performed by: HOSPITALIST

## 2021-01-01 PROCEDURE — 82570 ASSAY OF URINE CREATININE: CPT | Performed by: HOSPITALIST

## 2021-01-01 PROCEDURE — 4A023N7 MEASUREMENT OF CARDIAC SAMPLING AND PRESSURE, LEFT HEART, PERCUTANEOUS APPROACH: ICD-10-PCS | Performed by: INTERNAL MEDICINE

## 2021-01-01 PROCEDURE — 74176 CT ABD & PELVIS W/O CONTRAST: CPT | Performed by: EMERGENCY MEDICINE

## 2021-01-01 PROCEDURE — 71045 X-RAY EXAM CHEST 1 VIEW: CPT | Performed by: INTERNAL MEDICINE

## 2021-01-01 PROCEDURE — 83735 ASSAY OF MAGNESIUM: CPT | Performed by: HOSPITALIST

## 2021-01-01 PROCEDURE — 93306 TTE W/DOPPLER COMPLETE: CPT | Performed by: PHYSICIAN ASSISTANT

## 2021-01-01 PROCEDURE — 99291 CRITICAL CARE FIRST HOUR: CPT | Performed by: INTERNAL MEDICINE

## 2021-01-01 PROCEDURE — 99223 1ST HOSP IP/OBS HIGH 75: CPT | Performed by: NURSE PRACTITIONER

## 2021-01-01 PROCEDURE — 97110 THERAPEUTIC EXERCISES: CPT

## 2021-01-01 PROCEDURE — 93005 ELECTROCARDIOGRAM TRACING: CPT

## 2021-01-01 PROCEDURE — 80048 BASIC METABOLIC PNL TOTAL CA: CPT | Performed by: HOSPITALIST

## 2021-01-01 PROCEDURE — 80069 RENAL FUNCTION PANEL: CPT | Performed by: INTERNAL MEDICINE

## 2021-01-01 PROCEDURE — 84540 ASSAY OF URINE/UREA-N: CPT | Performed by: INTERNAL MEDICINE

## 2021-01-01 PROCEDURE — 97530 THERAPEUTIC ACTIVITIES: CPT

## 2021-01-01 PROCEDURE — 96360 HYDRATION IV INFUSION INIT: CPT

## 2021-01-01 PROCEDURE — 70496 CT ANGIOGRAPHY HEAD: CPT | Performed by: EMERGENCY MEDICINE

## 2021-01-01 PROCEDURE — 96375 TX/PRO/DX INJ NEW DRUG ADDON: CPT

## 2021-01-01 PROCEDURE — 84484 ASSAY OF TROPONIN QUANT: CPT | Performed by: EMERGENCY MEDICINE

## 2021-01-01 PROCEDURE — 70551 MRI BRAIN STEM W/O DYE: CPT | Performed by: INTERNAL MEDICINE

## 2021-01-01 PROCEDURE — 84156 ASSAY OF PROTEIN URINE: CPT | Performed by: INTERNAL MEDICINE

## 2021-01-01 PROCEDURE — 99284 EMERGENCY DEPT VISIT MOD MDM: CPT

## 2021-01-01 PROCEDURE — 93010 ELECTROCARDIOGRAM REPORT: CPT

## 2021-01-01 PROCEDURE — 96361 HYDRATE IV INFUSION ADD-ON: CPT

## 2021-01-01 PROCEDURE — 96376 TX/PRO/DX INJ SAME DRUG ADON: CPT

## 2021-01-01 PROCEDURE — 71045 X-RAY EXAM CHEST 1 VIEW: CPT | Performed by: EMERGENCY MEDICINE

## 2021-01-01 PROCEDURE — 97116 GAIT TRAINING THERAPY: CPT

## 2021-01-01 PROCEDURE — 71260 CT THORAX DX C+: CPT | Performed by: FAMILY MEDICINE

## 2021-01-01 PROCEDURE — 84295 ASSAY OF SERUM SODIUM: CPT | Performed by: HOSPITALIST

## 2021-01-01 PROCEDURE — 36410 VNPNXR 3YR/> PHY/QHP DX/THER: CPT

## 2021-01-01 PROCEDURE — 84295 ASSAY OF SERUM SODIUM: CPT | Performed by: INTERNAL MEDICINE

## 2021-01-01 PROCEDURE — 80053 COMPREHEN METABOLIC PANEL: CPT | Performed by: EMERGENCY MEDICINE

## 2021-01-01 PROCEDURE — 84550 ASSAY OF BLOOD/URIC ACID: CPT | Performed by: INTERNAL MEDICINE

## 2021-01-01 PROCEDURE — 82043 UR ALBUMIN QUANTITATIVE: CPT | Performed by: INTERNAL MEDICINE

## 2021-01-01 PROCEDURE — 81001 URINALYSIS AUTO W/SCOPE: CPT | Performed by: EMERGENCY MEDICINE

## 2021-01-01 PROCEDURE — 84300 ASSAY OF URINE SODIUM: CPT | Performed by: INTERNAL MEDICINE

## 2021-01-01 PROCEDURE — B211YZZ FLUOROSCOPY OF MULTIPLE CORONARY ARTERIES USING OTHER CONTRAST: ICD-10-PCS | Performed by: INTERNAL MEDICINE

## 2021-01-01 PROCEDURE — 99292 CRITICAL CARE ADDL 30 MIN: CPT | Performed by: INTERNAL MEDICINE

## 2021-01-01 PROCEDURE — 0DB68ZX EXCISION OF STOMACH, VIA NATURAL OR ARTIFICIAL OPENING ENDOSCOPIC, DIAGNOSTIC: ICD-10-PCS | Performed by: INTERNAL MEDICINE

## 2021-01-01 PROCEDURE — 85049 AUTOMATED PLATELET COUNT: CPT

## 2021-01-01 PROCEDURE — 72158 MRI LUMBAR SPINE W/O & W/DYE: CPT | Performed by: ORTHOPAEDIC SURGERY

## 2021-01-01 PROCEDURE — 96374 THER/PROPH/DIAG INJ IV PUSH: CPT

## 2021-01-01 PROCEDURE — 93306 TTE W/DOPPLER COMPLETE: CPT | Performed by: INTERNAL MEDICINE

## 2021-01-01 PROCEDURE — 0DJD8ZZ INSPECTION OF LOWER INTESTINAL TRACT, VIA NATURAL OR ARTIFICIAL OPENING ENDOSCOPIC: ICD-10-PCS | Performed by: INTERNAL MEDICINE

## 2021-01-01 PROCEDURE — 84450 TRANSFERASE (AST) (SGOT): CPT | Performed by: EMERGENCY MEDICINE

## 2021-01-01 PROCEDURE — 84443 ASSAY THYROID STIM HORMONE: CPT | Performed by: INTERNAL MEDICINE

## 2021-01-01 PROCEDURE — 82570 ASSAY OF URINE CREATININE: CPT | Performed by: EMERGENCY MEDICINE

## 2021-01-01 PROCEDURE — 81003 URINALYSIS AUTO W/O SCOPE: CPT | Performed by: EMERGENCY MEDICINE

## 2021-01-01 PROCEDURE — 76937 US GUIDE VASCULAR ACCESS: CPT

## 2021-01-01 PROCEDURE — 87086 URINE CULTURE/COLONY COUNT: CPT | Performed by: EMERGENCY MEDICINE

## 2021-01-01 PROCEDURE — 81001 URINALYSIS AUTO W/SCOPE: CPT | Performed by: HOSPITALIST

## 2021-01-01 PROCEDURE — 80069 RENAL FUNCTION PANEL: CPT | Performed by: HOSPITALIST

## 2021-01-01 PROCEDURE — 05HM33Z INSERTION OF INFUSION DEVICE INTO RIGHT INTERNAL JUGULAR VEIN, PERCUTANEOUS APPROACH: ICD-10-PCS | Performed by: INTERNAL MEDICINE

## 2021-01-01 PROCEDURE — 97161 PT EVAL LOW COMPLEX 20 MIN: CPT

## 2021-01-01 PROCEDURE — 85027 COMPLETE CBC AUTOMATED: CPT | Performed by: HOSPITALIST

## 2021-01-01 PROCEDURE — 80048 BASIC METABOLIC PNL TOTAL CA: CPT | Performed by: FAMILY MEDICINE

## 2021-01-01 PROCEDURE — 97535 SELF CARE MNGMENT TRAINING: CPT

## 2021-01-01 PROCEDURE — 84300 ASSAY OF URINE SODIUM: CPT | Performed by: HOSPITALIST

## 2021-01-01 PROCEDURE — 97165 OT EVAL LOW COMPLEX 30 MIN: CPT

## 2021-01-01 PROCEDURE — 83935 ASSAY OF URINE OSMOLALITY: CPT | Performed by: EMERGENCY MEDICINE

## 2021-01-01 PROCEDURE — 86901 BLOOD TYPING SEROLOGIC RH(D): CPT

## 2021-01-01 PROCEDURE — 93880 EXTRACRANIAL BILAT STUDY: CPT | Performed by: INTERNAL MEDICINE

## 2021-01-01 PROCEDURE — 84300 ASSAY OF URINE SODIUM: CPT | Performed by: EMERGENCY MEDICINE

## 2021-01-01 PROCEDURE — 76700 US EXAM ABDOM COMPLETE: CPT | Performed by: HOSPITALIST

## 2021-01-01 PROCEDURE — 70498 CT ANGIOGRAPHY NECK: CPT | Performed by: EMERGENCY MEDICINE

## 2021-01-01 PROCEDURE — 97162 PT EVAL MOD COMPLEX 30 MIN: CPT

## 2021-01-01 PROCEDURE — 70450 CT HEAD/BRAIN W/O DYE: CPT | Performed by: EMERGENCY MEDICINE

## 2021-01-01 PROCEDURE — A9575 INJ GADOTERATE MEGLUMI 0.1ML: HCPCS

## 2021-01-01 PROCEDURE — 84132 ASSAY OF SERUM POTASSIUM: CPT | Performed by: EMERGENCY MEDICINE

## 2021-01-01 PROCEDURE — 30233N1 TRANSFUSION OF NONAUTOLOGOUS RED BLOOD CELLS INTO PERIPHERAL VEIN, PERCUTANEOUS APPROACH: ICD-10-PCS | Performed by: EMERGENCY MEDICINE

## 2021-01-01 RX ORDER — FOLIC ACID 1 MG/1
2 TABLET ORAL DAILY
Status: DISCONTINUED | OUTPATIENT
Start: 2021-01-01 | End: 2021-01-01

## 2021-01-01 RX ORDER — POLYETHYLENE GLYCOL 3350 17 G/17G
17 POWDER, FOR SOLUTION ORAL DAILY
Status: DISCONTINUED | OUTPATIENT
Start: 2021-01-01 | End: 2021-01-01

## 2021-01-01 RX ORDER — ACETAMINOPHEN AND CODEINE PHOSPHATE 300; 30 MG/1; MG/1
1 TABLET ORAL NIGHTLY PRN
Status: ON HOLD | COMMUNITY
End: 2021-01-01

## 2021-01-01 RX ORDER — SODIUM CHLORIDE, SODIUM LACTATE, POTASSIUM CHLORIDE, CALCIUM CHLORIDE 600; 310; 30; 20 MG/100ML; MG/100ML; MG/100ML; MG/100ML
INJECTION, SOLUTION INTRAVENOUS CONTINUOUS
Status: DISCONTINUED | OUTPATIENT
Start: 2021-01-01 | End: 2021-01-01

## 2021-01-01 RX ORDER — ACETAMINOPHEN 500 MG
500 TABLET ORAL EVERY 6 HOURS PRN
Status: DISCONTINUED | OUTPATIENT
Start: 2021-01-01 | End: 2021-01-01

## 2021-01-01 RX ORDER — NALOXONE HYDROCHLORIDE 0.4 MG/ML
80 INJECTION, SOLUTION INTRAMUSCULAR; INTRAVENOUS; SUBCUTANEOUS AS NEEDED
Status: DISCONTINUED | OUTPATIENT
Start: 2021-01-01 | End: 2021-01-01 | Stop reason: HOSPADM

## 2021-01-01 RX ORDER — ONDANSETRON 2 MG/ML
4 INJECTION INTRAMUSCULAR; INTRAVENOUS EVERY 6 HOURS PRN
Status: DISCONTINUED | OUTPATIENT
Start: 2021-01-01 | End: 2021-01-01

## 2021-01-01 RX ORDER — ACETAMINOPHEN 650 MG/1
650 SUPPOSITORY RECTAL EVERY 4 HOURS PRN
Status: DISCONTINUED | OUTPATIENT
Start: 2021-01-01 | End: 2021-01-01

## 2021-01-01 RX ORDER — LISINOPRIL 20 MG/1
20 TABLET ORAL DAILY
Qty: 30 TABLET | Refills: 0 | Status: SHIPPED | OUTPATIENT
Start: 2021-01-01 | End: 2021-01-01

## 2021-01-01 RX ORDER — ASPIRIN 81 MG/1
81 TABLET, CHEWABLE ORAL DAILY
Status: ON HOLD | COMMUNITY
End: 2021-01-01

## 2021-01-01 RX ORDER — METOPROLOL SUCCINATE 50 MG/1
50 TABLET, EXTENDED RELEASE ORAL
Status: DISCONTINUED | OUTPATIENT
Start: 2021-01-01 | End: 2021-01-01

## 2021-01-01 RX ORDER — SODIUM CHLORIDE 9 MG/ML
INJECTION, SOLUTION INTRAVENOUS ONCE
Status: COMPLETED | OUTPATIENT
Start: 2021-01-01 | End: 2021-01-01

## 2021-01-01 RX ORDER — HYDROCODONE BITARTRATE AND ACETAMINOPHEN 5; 325 MG/1; MG/1
1 TABLET ORAL ONCE
Status: COMPLETED | OUTPATIENT
Start: 2021-01-01 | End: 2021-01-01

## 2021-01-01 RX ORDER — ENOXAPARIN SODIUM 100 MG/ML
30 INJECTION SUBCUTANEOUS DAILY
Status: DISCONTINUED | OUTPATIENT
Start: 2021-01-01 | End: 2021-01-01

## 2021-01-01 RX ORDER — MORPHINE SULFATE 4 MG/ML
4 INJECTION, SOLUTION INTRAMUSCULAR; INTRAVENOUS EVERY 2 HOUR PRN
Status: DISCONTINUED | OUTPATIENT
Start: 2021-01-01 | End: 2021-01-01

## 2021-01-01 RX ORDER — MIDODRINE HYDROCHLORIDE 5 MG/1
5 TABLET ORAL 3 TIMES DAILY
Status: DISCONTINUED | OUTPATIENT
Start: 2021-01-01 | End: 2021-01-01

## 2021-01-01 RX ORDER — POLYETHYLENE GLYCOL 3350 17 G/17G
17 POWDER, FOR SOLUTION ORAL 2 TIMES DAILY
Status: DISCONTINUED | OUTPATIENT
Start: 2021-01-01 | End: 2021-01-01

## 2021-01-01 RX ORDER — MORPHINE SULFATE 2 MG/ML
1 INJECTION, SOLUTION INTRAMUSCULAR; INTRAVENOUS EVERY 2 HOUR PRN
Status: DISCONTINUED | OUTPATIENT
Start: 2021-01-01 | End: 2021-01-01

## 2021-01-01 RX ORDER — FUROSEMIDE 40 MG/1
40 TABLET ORAL DAILY
Status: DISCONTINUED | OUTPATIENT
Start: 2021-01-01 | End: 2021-01-01

## 2021-01-01 RX ORDER — HYDRALAZINE HYDROCHLORIDE 20 MG/ML
10 INJECTION INTRAMUSCULAR; INTRAVENOUS EVERY 2 HOUR PRN
Status: DISCONTINUED | OUTPATIENT
Start: 2021-01-01 | End: 2021-01-01

## 2021-01-01 RX ORDER — ROSUVASTATIN CALCIUM 10 MG/1
10 TABLET, COATED ORAL NIGHTLY
Qty: 90 TABLET | Refills: 3 | Status: ON HOLD | OUTPATIENT
Start: 2021-01-01 | End: 2021-01-01

## 2021-01-01 RX ORDER — LISINOPRIL 10 MG/1
10 TABLET ORAL DAILY
Status: DISCONTINUED | OUTPATIENT
Start: 2021-01-01 | End: 2021-01-01

## 2021-01-01 RX ORDER — METOPROLOL SUCCINATE 25 MG/1
25 TABLET, EXTENDED RELEASE ORAL ONCE
Status: COMPLETED | OUTPATIENT
Start: 2021-01-01 | End: 2021-01-01

## 2021-01-01 RX ORDER — HYDROCODONE BITARTRATE AND ACETAMINOPHEN 5; 325 MG/1; MG/1
2 TABLET ORAL EVERY 4 HOURS PRN
Status: DISCONTINUED | OUTPATIENT
Start: 2021-01-01 | End: 2021-01-01

## 2021-01-01 RX ORDER — AMLODIPINE BESYLATE 5 MG/1
5 TABLET ORAL DAILY
Status: DISCONTINUED | OUTPATIENT
Start: 2021-01-01 | End: 2021-01-01

## 2021-01-01 RX ORDER — HEPARIN SODIUM AND DEXTROSE 10000; 5 [USP'U]/100ML; G/100ML
INJECTION INTRAVENOUS CONTINUOUS
Status: DISCONTINUED | OUTPATIENT
Start: 2021-01-01 | End: 2021-01-01

## 2021-01-01 RX ORDER — IPRATROPIUM BROMIDE AND ALBUTEROL SULFATE 2.5; .5 MG/3ML; MG/3ML
3 SOLUTION RESPIRATORY (INHALATION) EVERY 4 HOURS PRN
Status: DISCONTINUED | OUTPATIENT
Start: 2021-01-01 | End: 2021-01-01

## 2021-01-01 RX ORDER — DOBUTAMINE HYDROCHLORIDE 400 MG/100ML
5 INJECTION INTRAVENOUS CONTINUOUS
Status: DISCONTINUED | OUTPATIENT
Start: 2021-01-01 | End: 2021-01-01

## 2021-01-01 RX ORDER — IPRATROPIUM BROMIDE AND ALBUTEROL SULFATE 2.5; .5 MG/3ML; MG/3ML
3 SOLUTION RESPIRATORY (INHALATION)
Status: DISCONTINUED | OUTPATIENT
Start: 2021-01-01 | End: 2021-01-01

## 2021-01-01 RX ORDER — DICYCLOMINE HCL 20 MG
20 TABLET ORAL 4 TIMES DAILY PRN
Qty: 30 TABLET | Refills: 0 | Status: ON HOLD | OUTPATIENT
Start: 2021-01-01 | End: 2021-01-01

## 2021-01-01 RX ORDER — PHENYLEPHRINE HCL 10 MG/ML
VIAL (ML) INJECTION AS NEEDED
Status: DISCONTINUED | OUTPATIENT
Start: 2021-01-01 | End: 2021-01-01 | Stop reason: SURG

## 2021-01-01 RX ORDER — ONDANSETRON 2 MG/ML
4 INJECTION INTRAMUSCULAR; INTRAVENOUS ONCE
Status: COMPLETED | OUTPATIENT
Start: 2021-01-01 | End: 2021-01-01

## 2021-01-01 RX ORDER — LIDOCAINE HYDROCHLORIDE 10 MG/ML
INJECTION, SOLUTION EPIDURAL; INFILTRATION; INTRACAUDAL; PERINEURAL
Status: COMPLETED
Start: 2021-01-01 | End: 2021-01-01

## 2021-01-01 RX ORDER — ASPIRIN 81 MG/1
81 TABLET, CHEWABLE ORAL DAILY
Status: DISCONTINUED | OUTPATIENT
Start: 2021-01-01 | End: 2021-01-01

## 2021-01-01 RX ORDER — BISMUTH SUBSALICYLATE 262 MG/1
TABLET, CHEWABLE ORAL
Status: ON HOLD | COMMUNITY
End: 2021-01-01

## 2021-01-01 RX ORDER — ONDANSETRON 8 MG/1
8 TABLET, ORALLY DISINTEGRATING ORAL EVERY 6 HOURS PRN
COMMUNITY

## 2021-01-01 RX ORDER — HYDROCODONE BITARTRATE AND ACETAMINOPHEN 5; 325 MG/1; MG/1
1 TABLET ORAL EVERY 8 HOURS PRN
Status: DISCONTINUED | OUTPATIENT
Start: 2021-01-01 | End: 2021-01-01

## 2021-01-01 RX ORDER — FUROSEMIDE 40 MG/1
40 TABLET ORAL DAILY
Qty: 60 TABLET | Refills: 0 | Status: SHIPPED | OUTPATIENT
Start: 2021-01-01 | End: 2021-01-01

## 2021-01-01 RX ORDER — HYDROMORPHONE HYDROCHLORIDE 1 MG/ML
0.2 INJECTION, SOLUTION INTRAMUSCULAR; INTRAVENOUS; SUBCUTANEOUS EVERY 2 HOUR PRN
Status: DISCONTINUED | OUTPATIENT
Start: 2021-01-01 | End: 2021-01-01

## 2021-01-01 RX ORDER — LACTULOSE 10 G/15ML
20 SOLUTION ORAL DAILY
Status: DISCONTINUED | OUTPATIENT
Start: 2021-01-01 | End: 2021-01-01

## 2021-01-01 RX ORDER — FUROSEMIDE 40 MG/1
40 TABLET ORAL 2 TIMES DAILY
Qty: 60 TABLET | Refills: 11 | Status: ON HOLD | OUTPATIENT
Start: 2021-01-01 | End: 2021-01-01

## 2021-01-01 RX ORDER — METOCLOPRAMIDE HYDROCHLORIDE 5 MG/ML
5 INJECTION INTRAMUSCULAR; INTRAVENOUS EVERY 8 HOURS PRN
Status: DISCONTINUED | OUTPATIENT
Start: 2021-01-01 | End: 2021-01-01

## 2021-01-01 RX ORDER — BUMETANIDE 0.25 MG/ML
2 INJECTION, SOLUTION INTRAMUSCULAR; INTRAVENOUS
Status: DISCONTINUED | OUTPATIENT
Start: 2021-01-01 | End: 2021-01-01

## 2021-01-01 RX ORDER — ASPIRIN 81 MG/1
81 TABLET ORAL DAILY
Status: ON HOLD | COMMUNITY
End: 2021-01-01 | Stop reason: SDUPTHER

## 2021-01-01 RX ORDER — HEPARIN SODIUM 5000 [USP'U]/ML
5000 INJECTION, SOLUTION INTRAVENOUS; SUBCUTANEOUS EVERY 8 HOURS SCHEDULED
Status: DISCONTINUED | OUTPATIENT
Start: 2021-01-01 | End: 2021-01-01

## 2021-01-01 RX ORDER — EZETIMIBE 10 MG/1
10 TABLET ORAL DAILY
Status: DISCONTINUED | OUTPATIENT
Start: 2021-01-01 | End: 2021-01-01

## 2021-01-01 RX ORDER — FUROSEMIDE 10 MG/ML
40 INJECTION INTRAMUSCULAR; INTRAVENOUS 2 TIMES DAILY
Status: DISCONTINUED | OUTPATIENT
Start: 2021-01-01 | End: 2021-01-01

## 2021-01-01 RX ORDER — LORAZEPAM 0.5 MG/1
0.5 TABLET ORAL EVERY 6 HOURS PRN
Status: DISCONTINUED | OUTPATIENT
Start: 2021-01-01 | End: 2021-01-01

## 2021-01-01 RX ORDER — FUROSEMIDE 40 MG/1
20 TABLET ORAL EVERY OTHER DAY
Qty: 60 TABLET | Refills: 0 | Status: SHIPPED | OUTPATIENT
Start: 2021-01-01 | End: 2021-01-01

## 2021-01-01 RX ORDER — LABETALOL HYDROCHLORIDE 5 MG/ML
10 INJECTION, SOLUTION INTRAVENOUS EVERY 10 MIN PRN
Status: DISCONTINUED | OUTPATIENT
Start: 2021-01-01 | End: 2021-01-01

## 2021-01-01 RX ORDER — HYDROMORPHONE HYDROCHLORIDE 1 MG/ML
0.4 INJECTION, SOLUTION INTRAMUSCULAR; INTRAVENOUS; SUBCUTANEOUS EVERY 2 HOUR PRN
Status: DISCONTINUED | OUTPATIENT
Start: 2021-01-01 | End: 2021-01-01

## 2021-01-01 RX ORDER — ALBUMIN (HUMAN) 12.5 G/50ML
25 SOLUTION INTRAVENOUS ONCE
Status: COMPLETED | OUTPATIENT
Start: 2021-01-01 | End: 2021-01-01

## 2021-01-01 RX ORDER — FUROSEMIDE 40 MG/1
40 TABLET ORAL DAILY
Qty: 30 TABLET | Refills: 0 | Status: SHIPPED | OUTPATIENT
Start: 2021-01-01 | End: 2021-01-01

## 2021-01-01 RX ORDER — SODIUM CHLORIDE 9 MG/ML
INJECTION, SOLUTION INTRAVENOUS CONTINUOUS
Status: DISCONTINUED | OUTPATIENT
Start: 2021-01-01 | End: 2021-01-01

## 2021-01-01 RX ORDER — ACETAMINOPHEN 325 MG/1
650 TABLET ORAL EVERY 4 HOURS PRN
Status: DISCONTINUED | OUTPATIENT
Start: 2021-01-01 | End: 2021-01-01

## 2021-01-01 RX ORDER — ACETAMINOPHEN AND CODEINE PHOSPHATE 300; 30 MG/1; MG/1
1 TABLET ORAL EVERY 6 HOURS PRN
Refills: 0 | Status: SHIPPED | COMMUNITY
Start: 2021-01-01 | End: 2021-01-01

## 2021-01-01 RX ORDER — POTASSIUM CHLORIDE 20 MEQ/1
40 TABLET, EXTENDED RELEASE ORAL ONCE
Status: COMPLETED | OUTPATIENT
Start: 2021-01-01 | End: 2021-01-01

## 2021-01-01 RX ORDER — ALBUMIN (HUMAN) 12.5 G/50ML
25 SOLUTION INTRAVENOUS EVERY 8 HOURS
Status: COMPLETED | OUTPATIENT
Start: 2021-01-01 | End: 2021-01-01

## 2021-01-01 RX ORDER — AMLODIPINE BESYLATE 5 MG/1
5 TABLET ORAL DAILY
COMMUNITY
End: 2021-01-01

## 2021-01-01 RX ORDER — ALBUMIN, HUMAN INJ 5% 5 %
250 SOLUTION INTRAVENOUS ONCE
Status: COMPLETED | OUTPATIENT
Start: 2021-01-01 | End: 2021-01-01

## 2021-01-01 RX ORDER — METOCLOPRAMIDE HYDROCHLORIDE 5 MG/ML
10 INJECTION INTRAMUSCULAR; INTRAVENOUS ONCE
Status: COMPLETED | OUTPATIENT
Start: 2021-01-01 | End: 2021-01-01

## 2021-01-01 RX ORDER — DEXTROSE MONOHYDRATE 50 MG/ML
INJECTION, SOLUTION INTRAVENOUS CONTINUOUS
Status: ACTIVE | OUTPATIENT
Start: 2021-01-01 | End: 2021-01-01

## 2021-01-01 RX ORDER — LORAZEPAM 1 MG/1
1 TABLET ORAL EVERY 6 HOURS PRN
Status: DISCONTINUED | OUTPATIENT
Start: 2021-01-01 | End: 2021-01-01

## 2021-01-01 RX ORDER — POLYETHYLENE GLYCOL 3350 17 G/17G
17 POWDER, FOR SOLUTION ORAL DAILY PRN
Status: DISCONTINUED | OUTPATIENT
Start: 2021-01-01 | End: 2021-01-01

## 2021-01-01 RX ORDER — ROSUVASTATIN CALCIUM 5 MG/1
5 TABLET, COATED ORAL NIGHTLY
Status: DISCONTINUED | OUTPATIENT
Start: 2021-01-01 | End: 2021-01-01

## 2021-01-01 RX ORDER — PANTOPRAZOLE SODIUM 40 MG/1
40 TABLET, DELAYED RELEASE ORAL
Qty: 30 TABLET | Refills: 0 | Status: SHIPPED | OUTPATIENT
Start: 2021-01-01

## 2021-01-01 RX ORDER — FUROSEMIDE 40 MG/1
40 TABLET ORAL
Status: DISCONTINUED | OUTPATIENT
Start: 2021-01-01 | End: 2021-01-01

## 2021-01-01 RX ORDER — ROSUVASTATIN CALCIUM 10 MG/1
10 TABLET, COATED ORAL NIGHTLY
Status: DISCONTINUED | OUTPATIENT
Start: 2021-01-01 | End: 2021-01-01

## 2021-01-01 RX ORDER — PANTOPRAZOLE SODIUM 40 MG/1
40 TABLET, DELAYED RELEASE ORAL
Status: DISCONTINUED | OUTPATIENT
Start: 2021-01-01 | End: 2021-01-01

## 2021-01-01 RX ORDER — METOPROLOL SUCCINATE 25 MG/1
25 TABLET, EXTENDED RELEASE ORAL
Status: DISCONTINUED | OUTPATIENT
Start: 2021-01-01 | End: 2021-01-01

## 2021-01-01 RX ORDER — ACETAMINOPHEN AND CODEINE PHOSPHATE 300; 30 MG/1; MG/1
1 TABLET ORAL EVERY 8 HOURS PRN
Qty: 12 TABLET | Refills: 0 | Status: SHIPPED | OUTPATIENT
Start: 2021-01-01 | End: 2021-01-01

## 2021-01-01 RX ORDER — BUMETANIDE 0.25 MG/ML
1 INJECTION, SOLUTION INTRAMUSCULAR; INTRAVENOUS
Status: DISCONTINUED | OUTPATIENT
Start: 2021-01-01 | End: 2021-01-01

## 2021-01-01 RX ORDER — HEPARIN SODIUM 5000 [USP'U]/ML
INJECTION, SOLUTION INTRAVENOUS; SUBCUTANEOUS
Status: COMPLETED
Start: 2021-01-01 | End: 2021-01-01

## 2021-01-01 RX ORDER — FUROSEMIDE 10 MG/ML
40 INJECTION INTRAMUSCULAR; INTRAVENOUS ONCE
Status: COMPLETED | OUTPATIENT
Start: 2021-01-01 | End: 2021-01-01

## 2021-01-01 RX ORDER — HYDROCODONE BITARTRATE AND ACETAMINOPHEN 5; 325 MG/1; MG/1
1 TABLET ORAL EVERY 6 HOURS PRN
Status: DISCONTINUED | OUTPATIENT
Start: 2021-01-01 | End: 2021-01-01

## 2021-01-01 RX ORDER — TEMAZEPAM 15 MG/1
15 CAPSULE ORAL NIGHTLY PRN
Status: DISCONTINUED | OUTPATIENT
Start: 2021-01-01 | End: 2021-01-01

## 2021-01-01 RX ORDER — METOPROLOL SUCCINATE 50 MG/1
50 TABLET, EXTENDED RELEASE ORAL
Qty: 90 TABLET | Refills: 3 | Status: SHIPPED | OUTPATIENT
Start: 2021-01-01 | End: 2021-01-01

## 2021-01-01 RX ORDER — EZETIMIBE 10 MG/1
10 TABLET ORAL NIGHTLY
Status: DISCONTINUED | OUTPATIENT
Start: 2021-01-01 | End: 2021-01-01

## 2021-01-01 RX ORDER — POTASSIUM CHLORIDE 20 MEQ/1
40 TABLET, EXTENDED RELEASE ORAL EVERY 4 HOURS
Status: COMPLETED | OUTPATIENT
Start: 2021-01-01 | End: 2021-01-01

## 2021-01-01 RX ORDER — LISINOPRIL 10 MG/1
10 TABLET ORAL DAILY
Qty: 30 TABLET | Refills: 0 | Status: ON HOLD | OUTPATIENT
Start: 2021-01-01 | End: 2021-01-01

## 2021-01-01 RX ORDER — ACETAMINOPHEN 325 MG/1
650 TABLET ORAL EVERY 6 HOURS PRN
Status: DISCONTINUED | OUTPATIENT
Start: 2021-01-01 | End: 2021-01-01

## 2021-01-01 RX ORDER — DICYCLOMINE HCL 20 MG
20 TABLET ORAL 4 TIMES DAILY PRN
Status: DISCONTINUED | OUTPATIENT
Start: 2021-01-01 | End: 2021-01-01

## 2021-01-01 RX ORDER — NITROGLYCERIN 20 MG/100ML
INJECTION INTRAVENOUS
Status: COMPLETED
Start: 2021-01-01 | End: 2021-01-01

## 2021-01-01 RX ORDER — SODIUM CHLORIDE 9 MG/ML
INJECTION, SOLUTION INTRAVENOUS CONTINUOUS
Status: ACTIVE | OUTPATIENT
Start: 2021-01-01 | End: 2021-01-01

## 2021-01-01 RX ORDER — POTASSIUM CHLORIDE 20 MEQ/1
20 TABLET, EXTENDED RELEASE ORAL ONCE
Status: COMPLETED | OUTPATIENT
Start: 2021-01-01 | End: 2021-01-01

## 2021-01-01 RX ORDER — VERAPAMIL HYDROCHLORIDE 2.5 MG/ML
INJECTION, SOLUTION INTRAVENOUS
Status: COMPLETED
Start: 2021-01-01 | End: 2021-01-01

## 2021-01-01 RX ORDER — MIDAZOLAM HYDROCHLORIDE 1 MG/ML
INJECTION INTRAMUSCULAR; INTRAVENOUS
Status: COMPLETED
Start: 2021-01-01 | End: 2021-01-01

## 2021-01-01 RX ORDER — LISINOPRIL 10 MG/1
10 TABLET ORAL DAILY
Qty: 90 TABLET | Refills: 3 | Status: SHIPPED | OUTPATIENT
Start: 2021-01-01 | End: 2021-01-01

## 2021-01-01 RX ORDER — HEPARIN SODIUM AND DEXTROSE 10000; 5 [USP'U]/100ML; G/100ML
12 INJECTION INTRAVENOUS ONCE
Status: COMPLETED | OUTPATIENT
Start: 2021-01-01 | End: 2021-01-01

## 2021-01-01 RX ORDER — BUMETANIDE 0.25 MG/ML
2 INJECTION, SOLUTION INTRAMUSCULAR; INTRAVENOUS ONCE
Status: COMPLETED | OUTPATIENT
Start: 2021-01-01 | End: 2021-01-01

## 2021-01-01 RX ORDER — BISACODYL 10 MG
10 SUPPOSITORY, RECTAL RECTAL
Status: DISCONTINUED | OUTPATIENT
Start: 2021-01-01 | End: 2021-01-01

## 2021-01-01 RX ORDER — MIDODRINE HYDROCHLORIDE 5 MG/1
10 TABLET ORAL 3 TIMES DAILY
Status: DISCONTINUED | OUTPATIENT
Start: 2021-01-01 | End: 2021-01-01

## 2021-01-01 RX ORDER — METOPROLOL SUCCINATE 50 MG/1
50 TABLET, EXTENDED RELEASE ORAL 2 TIMES DAILY
Status: DISCONTINUED | OUTPATIENT
Start: 2021-01-01 | End: 2021-01-01

## 2021-01-01 RX ORDER — ACETAMINOPHEN 500 MG
500 TABLET ORAL EVERY 6 HOURS PRN
COMMUNITY

## 2021-01-01 RX ORDER — MORPHINE SULFATE 4 MG/ML
4 INJECTION, SOLUTION INTRAMUSCULAR; INTRAVENOUS EVERY 30 MIN PRN
Status: DISCONTINUED | OUTPATIENT
Start: 2021-01-01 | End: 2021-01-01

## 2021-01-01 RX ORDER — SODIUM CHLORIDE 9 MG/ML
125 INJECTION, SOLUTION INTRAVENOUS CONTINUOUS
Status: DISCONTINUED | OUTPATIENT
Start: 2021-01-01 | End: 2021-01-01

## 2021-01-01 RX ORDER — DOCUSATE SODIUM 100 MG/1
100 CAPSULE, LIQUID FILLED ORAL 2 TIMES DAILY
Status: DISCONTINUED | OUTPATIENT
Start: 2021-01-01 | End: 2021-01-01

## 2021-01-01 RX ORDER — HYDROMORPHONE HYDROCHLORIDE 1 MG/ML
0.8 INJECTION, SOLUTION INTRAMUSCULAR; INTRAVENOUS; SUBCUTANEOUS EVERY 2 HOUR PRN
Status: DISCONTINUED | OUTPATIENT
Start: 2021-01-01 | End: 2021-01-01

## 2021-01-01 RX ORDER — ACETAMINOPHEN AND CODEINE PHOSPHATE 300; 30 MG/1; MG/1
1 TABLET ORAL EVERY 6 HOURS PRN
Status: DISCONTINUED | OUTPATIENT
Start: 2021-01-01 | End: 2021-01-01

## 2021-01-01 RX ORDER — LIDOCAINE HYDROCHLORIDE 10 MG/ML
INJECTION, SOLUTION EPIDURAL; INFILTRATION; INTRACAUDAL; PERINEURAL AS NEEDED
Status: DISCONTINUED | OUTPATIENT
Start: 2021-01-01 | End: 2021-01-01 | Stop reason: SURG

## 2021-01-01 RX ORDER — ONDANSETRON 2 MG/ML
4 INJECTION INTRAMUSCULAR; INTRAVENOUS EVERY 4 HOURS PRN
Status: DISCONTINUED | OUTPATIENT
Start: 2021-01-01 | End: 2021-01-01

## 2021-01-01 RX ORDER — SODIUM CHLORIDE 9 MG/ML
1000 INJECTION, SOLUTION INTRAVENOUS ONCE
Status: COMPLETED | OUTPATIENT
Start: 2021-01-01 | End: 2021-01-01

## 2021-01-01 RX ORDER — CEPHALEXIN 500 MG/1
500 CAPSULE ORAL 4 TIMES DAILY
Qty: 40 CAPSULE | Refills: 0 | Status: SHIPPED | OUTPATIENT
Start: 2021-01-01 | End: 2021-01-01

## 2021-01-01 RX ORDER — MORPHINE SULFATE 2 MG/ML
2 INJECTION, SOLUTION INTRAMUSCULAR; INTRAVENOUS EVERY 2 HOUR PRN
Status: DISCONTINUED | OUTPATIENT
Start: 2021-01-01 | End: 2021-01-01

## 2021-01-01 RX ORDER — LORAZEPAM 1 MG/1
1 TABLET ORAL EVERY 4 HOURS PRN
Status: DISCONTINUED | OUTPATIENT
Start: 2021-01-01 | End: 2021-01-01

## 2021-01-01 RX ORDER — HYDROCODONE BITARTRATE AND ACETAMINOPHEN 5; 325 MG/1; MG/1
1 TABLET ORAL EVERY 4 HOURS PRN
Status: DISCONTINUED | OUTPATIENT
Start: 2021-01-01 | End: 2021-01-01

## 2021-01-01 RX ADMIN — PHENYLEPHRINE HCL 50 MCG: 10 MG/ML VIAL (ML) INJECTION at 10:57:00

## 2021-01-01 RX ADMIN — PHENYLEPHRINE HCL 100 MCG: 10 MG/ML VIAL (ML) INJECTION at 11:06:00

## 2021-01-01 RX ADMIN — LIDOCAINE HYDROCHLORIDE 120 MG: 10 INJECTION, SOLUTION EPIDURAL; INFILTRATION; INTRACAUDAL; PERINEURAL at 10:52:00

## 2021-01-01 RX ADMIN — PHENYLEPHRINE HCL 100 MCG: 10 MG/ML VIAL (ML) INJECTION at 11:14:00

## 2021-01-05 PROBLEM — I65.23 CAROTID STENOSIS, BILATERAL: Status: ACTIVE | Noted: 2021-01-01

## 2021-01-12 NOTE — PROGRESS NOTES
Patient notified of  [facility]  comments & recommendations. Patient verbalized understanding . Patient will call Dr Benja Herrera to see if he wants move appointment sooner.

## 2021-03-05 PROBLEM — C34.31 MALIGNANT NEOPLASM OF LOWER LOBE OF RIGHT LUNG (HCC): Status: ACTIVE | Noted: 2021-01-01

## 2021-04-08 PROBLEM — C34.91 NON-SMALL CELL LUNG CANCER, RIGHT (HCC): Status: ACTIVE | Noted: 2021-01-01

## 2021-05-16 PROBLEM — S32.010A CLOSED COMPRESSION FRACTURE OF BODY OF L1 VERTEBRA (HCC): Status: ACTIVE | Noted: 2021-01-01

## 2021-05-16 PROBLEM — N28.0 RENAL INFARCT (HCC): Status: ACTIVE | Noted: 2021-01-01

## 2021-05-16 NOTE — PROGRESS NOTES
[facility] Pharmacy Note:  Renal Dose Adjustment for enoxaparin (LOVENOX)    Sharon Baxter has been prescribed enoxaparin 40 mg subcutaneously every 24 hours. Estimated Creatinine Clearance: 27.8 mL/min (A) (based on SCr of 1.34 mg/dL (H)).     Calculated

## 2021-05-16 NOTE — H&P
[facility] Hospitalist H&P       CC: Patient presents with:  Back Pain       PCP: Venu Hammond DO    History of Present Illness: 67 y/o w hx of cva, RA, copd, htn, hl, lung ca on chemo, lumbar disc disease was to get lumbar decompression but then had rt lung Tab, Take 1 tablet by mouth daily. , Disp: , Rfl:   aspirin (ASPIRIN 81) 81 MG Oral Chew Tab, Aspir-81, Disp: , Rfl:           Soc Hx  Social History    Tobacco Use      Smoking status: Current Every Day Smoker        Packs/day: 0.50        Years: 57.00 distended   Extremities: Good rom motion left leg and knee, can bend right knee but has pain to back, able to wiggle toes  Extremities normal, atraumatic, no cyanosis or edema. Skin: Skin color, texture, turgor normal. No rashes or lesions.     Neurologic decompression with ortho spine was held off on d/t lung surgery    AAA: 4.7 x 4.4 w severe stenosis at origin of renal arteries    Arf: resolved    Hyponatremia: fluids, check urine osm/urine lytes  -possible siadh w lung ca  -hold hydrochlorothiazide    H

## 2021-05-16 NOTE — ED INITIAL ASSESSMENT (HPI)
65 yo F, hx of CVA, chronic back pain, lung cancer s/p lobectomy came in complaining of right lower back pain radiating to the right leg. States back pain got worst 3 days ago, to the point where she cant walk.  She also reports urinary frequency, and feels

## 2021-05-16 NOTE — PROGRESS NOTES
NURSING ADMISSION NOTE      Patient admitted via CART  Oriented to room. Safety precautions initiated. Bed in low position. Call light in reach. Routine admission care rendered,MD to paged for orders.

## 2021-05-16 NOTE — PLAN OF CARE
5/15 ED admit Rt sided back and leg pain, Pt is AAOX4, VSS, room air to 2L O2 NC, TELE, pain w/ movement, up stand pivot to bedside chair, and commode, voiding freely to commode, IVF, Spine MD consulted, placed orders for MRI w/ and w/out contrast D/T CA H

## 2021-05-16 NOTE — ED PROVIDER NOTES
Patient Seen in: [facility] Emergency Department      History   Patient presents with:  Back Pain    Stated Complaint: back pain     HPI/Subjective:   HPI    Patient is a 47-year-old female with a history of non-small cell lung cancer on chemo, last Smoker        Packs/day: 0.50        Years: 57.00        Pack years: 28.5        Types: Cigarettes        Start date: 1963      Smokeless tobacco: Never Used      Tobacco comment: max 0.75 pk/dy, 0.5 pk/dy    Vaping Use      Vaping Use: Never used    [employer] normal.              ED Course     Labs Reviewed   COMP METABOLIC PANEL (14) - Abnormal; Notable for the following components:       Result Value    Glucose 129 (*)     Sodium 133 (*)     BUN 33 (*)     Creatinine 1.34 (*)     BUN/CREA Ratio 24.6 frequency. CONTRAST USED:  80cc of Visipaque 320         FINDINGS:      LIVER:  No enlargement, atrophy, abnormal density, or significant focal     lesion. BILIARY:  No visible dilatation or calcification.       PANCREAS:  No lesion, fluid co the bowel. Moderate amount of stool is noted. 4. Vascular calcifications in the aorta with likely severe stenosis at the     takeoff of the renal arteries is suggested. 5. Superior endplate L1 compression deformity is noted.   If patien is no pathologic metastatic disease. There is a L1 compression fracture although her pain is mostly on the right flank on my exam.  There is evidence of renal infarcts. I suspect this may be contributing to her pain.   Patient will need further work-up re

## 2021-05-17 NOTE — CONSULTS
[facility]    Report of Consultation    Jorene Rubinstein Patient Status:  Observation    1945 MRN [id number]   [facility] 3SW-A Attending Garnet Moritz, MD   [facility] Day # 0 PCP Frankie Chamorro DO     Date of consult: 2021    RE reports that she has been smoking cigarettes. She started smoking about 58 years ago. She has a 28.50 pack-year smoking history. She has never used smokeless tobacco. She reports that she does not drink alcohol and does not use drugs.     ALLERGIES: at 5/17/2021 1500  Gross per 24 hour   Intake 1647 ml   Output 3200 ml   Net -1553 ml     Wt Readings from Last 3 Encounters:  05/15/21 : 180 lb (81.6 kg)  04/08/21 : 187 lb (84.8 kg)  03/24/21 : 188 lb 4.8 oz (85.4 kg)      General: NAD  HEENT: NCAT, MMM, (A) 05/17/2021    UROBILINOGEN <2.0 05/17/2021    NITRITE Negative 05/17/2021    LEUUR Negative 05/17/2021    NMIC Microscopic not indicated 05/15/2021    WBCUR 1-5 05/17/2021    RBCUR 0-2 05/17/2021    EPIUR Few (A) 05/17/2021    BACUR None Seen 05/17/202 COPD, HTN, HL, Lung ca on chemo, lumbar disc disease who was to get lumbar decompression but then had R lung surgery, c/o right sided lower back pain radiating to R leg. On carbo/taxol for chemo. On hydrochlorothiazide as outpt.       Hyponatremia   -- at h

## 2021-05-17 NOTE — PROGRESS NOTES
S: She has controlled back pain and right leg pain. She had her MRI. Inspection:  Awake alert No acute distress. No difficulty breathing     Blood pressure 150/84, pulse 80, temperature 98.4 °F (36.9 °C), temperature source Oral, resp.  rate 20, height head, neck, trunk and arms/legs. No rashes, mottling or ulcerations. LYMPH EXAM: There is no lymph edema in either lower extremity. VASCULAR EXAM:  Good distal perfusion. No clubbing or cyanosis. Calves supple, NT bilaterally  ABDOMINAL EXAM: Abdomen is s

## 2021-05-17 NOTE — PROGRESS NOTES
Pain Service  Request for appointment for outpatient follow up with [facility]    67 yo with PMH CVA, RA, COPD, HTN, HL, lung ca, and back pain now with radicular symptoms RLE.  Off unit for MRI of lumbar spine to rule out L1 fracture and lumbar radiculopathy

## 2021-05-17 NOTE — PROGRESS NOTES
S: She says she had a flare up of pain in her back going down the right leg 6 days ago. She is known to our service and was scheduled for surgery prior to having been diagnoses with non-small cell Ca.   She was admitted for her pain and says currently she

## 2021-05-17 NOTE — PLAN OF CARE
5/15 ED admit Rt sided back and leg pain, Pt is AAOX4, VSS, room air to 2L O2 NC, TELE, pain w/ movement, up stand pivot to bedside chair, and commode, voiding freely to commode, UA sent, IVF, Spine rounded, MRI completed, consult placed for pain service f

## 2021-05-17 NOTE — PLAN OF CARE
A/O VSS O2 2L nc. Tele. Right sided back and leg pain with movement. Using BSC. Plan of care reviewed. Safety precautions maintained. Bed alarm on. Call light within reach.

## 2021-05-17 NOTE — PROGRESS NOTES
[facility] Hospitalist Progress Note     CC: Hospital Follow up    PCP: Dwain Dickinson DO       Assessment/Plan:     Principal Problem:    Renal infarct [facility])  Active Problems:    Closed compression fracture of body of L1 vertebra (HCC)    Back pain:   -ct abd d wheezes  CV: RRR, no murmurs, 2+ peripheral pulses  ABD: Soft, non-tender, non-distended, +BS  MSK: strength 5/5 in all extremities  Neuro: Grossly normal, CN intact, sensory intact  Psych: Affect- normal  SKIN: warm, dry  EXT: no edema      Data Review: aspirin  81 mg Oral Daily   • enoxaparin  30 mg Subcutaneous Daily   • ezetimibe  10 mg Oral Daily     • sodium chloride 83 mL/hr at 05/17/21 0400     acetaminophen **OR** HYDROcodone-acetaminophen **OR** HYDROcodone-acetaminophen, morphINE sulfate **OR**

## 2021-05-18 NOTE — PROGRESS NOTES
Vascular Surgery Progress Note    /53 (BP Location: Left arm)   Pulse 79   Temp 98.3 °F (36.8 °C) (Oral)   Resp 19   Ht 5' 2\" (1.575 m)   Wt 180 lb (81.6 kg)   SpO2 94%   BMI 32.92 kg/m²     Recent Labs   Lab 05/15/21  2243 05/15/21  2243 05/16/21

## 2021-05-18 NOTE — PLAN OF CARE
States pain radiates to right leg from back and is controlled on oral pain medication. States last bowel movement 4 days ago, laxative given, md aware, will follow through with new orders(stool softner, suppository).   Instructed on plan of care, call don't

## 2021-05-18 NOTE — PROGRESS NOTES
Pain Service  Cancelled appointment with [facility] MD that was scheduled for tomorrow 5/19 at 11a in light of AAA. Patient will require anticoagulation and will not be a candidate for injection until AAA repair and when/if she can be off antithrombotics.

## 2021-05-18 NOTE — PROGRESS NOTES
[facility] Hospitalist Progress Note     CC: Hospital Follow up    PCP: Danuta Nicholas DO       Assessment/Plan:     Principal Problem:    Renal infarct [facility])  Active Problems:    Closed compression fracture of body of L1 vertebra (HCC)    Back pain:   -ct abd d Last 3 Weights  05/15/21 2228 : 180 lb (81.6 kg)  04/08/21 1117 : 187 lb (84.8 kg)  03/24/21 1302 : 188 lb 4.8 oz (85.4 kg)      Exam   GEN: NAD  HEENT: EOMI, PERRLA  Neck: Supple, no JVD  Pulm: CTAB, no crackles or wheezes  CV: RRR, no murmurs, 2+ p as above. 2. On the  sagittal imaging, there are age indeterminate compression deformities of the superior endplates of T2, T5, and T6 which could be further characterized with dedicated thoracic spine MRI as clinically directed.   3. Multilevel degen Daily   • ezetimibe  10 mg Oral Daily       PEG 3350, acetaminophen **OR** HYDROcodone-acetaminophen **OR** HYDROcodone-acetaminophen, morphINE sulfate **OR** morphINE sulfate **OR** morphINE sulfate, morphINE sulfate

## 2021-05-18 NOTE — PLAN OF CARE
A&O x 4. VSS. O2 2L per NC. Tele- NSR. Numbness to RLE pre baseline. LBP well controlled with Norco PRN. Up with min assist and walker to bathroom, voiding without issue. No BM since 5/14, MD paged for Miralax order. SCD's/Lovenox/ASA.  1200 ml fluid restri

## 2021-05-18 NOTE — PROGRESS NOTES
[facility]    Nephrology Progress Note    Gin Powell Attending:  Enrique Gray MD       SUBJECTIVE:     R sided pain has improved  No urinary complaints, cp, sob    PHYSICAL EXAM:     Vital Signs: /59 (BP Location: Right arm)   Pulse 74 LYPERCENT 21.1 05/16/2021    MOPERCENT 4.0 05/16/2021    EOPERCENT 0.0 05/16/2021    BAPERCENT 0.2 05/16/2021    NE 4.22 05/16/2021    LYMABS 1.20 05/16/2021    MOABSO 0.23 05/16/2021    EOABSO 0.00 05/16/2021    BAABSO 0.01 05/16/2021     Lab Results   Co partial lung resection 2/2021, spinal stenosis, presented with R lower back pain. Found to have R renal infarct, bilat BRIAN,  Hyponatremia and AAA on imaging.  Nephrology consulted for hyponatremia and JASON:    JASON:   -- can likely resume low dose ACEi on dis

## 2021-05-19 NOTE — PLAN OF CARE
Back pain controlled with PO pain medication. VS stable on room air. Eliquis given per order. SCDs bilaterally, ankle pumps encouraged. Tolerating diet, denies nausea. Fluid restriction maintained. Voiding freely, last BM 5/18.  Worked with PT, ambulating x

## 2021-05-19 NOTE — CONSULTS
[facility]  Report of Consultation    Roselyn Rendon Patient Status:  Observation    1945 MRN [id number]   [facility] 3SW-A Attending Umesh Gordon MD   Hosp Day # 0 PCP Sharifa Asif DO     ADMIT DATE AND TIME: 5/15/2021  9: 5/17/2021  CONCLUSION:  Transitional anatomy at the lumbosacral junction as above. 1. Redemonstration of recent compression deformities of the inferior endplate of K57, superior endplate of L2, and inferior endplate of L4 as above.   2. On the  sagitt Problem Relation Age of Onset   • Stroke Father    • Diabetes Mother    • Hypertension Mother    • No Known Problems Daughter    • Arthritis Sister         pt believes it is RA   • Alcohol and Other Disorders Associated Brother    • No Known Problems Mai 10 mg, 10 mg, Oral, Daily  •  morphINE sulfate (PF) 4 MG/ML injection 4 mg, 4 mg, Intravenous, Q30 Min PRN    Review of Systems:  A 10-point review of systems was done with pertinent positives and negatives per the HPI.       Physical Exam:   Blood pressure Hematocrit Platelet Count   Latest Ref Rng & Units       35.0 - 48.0 % 150.0 - 450.0 10(3)uL   5/19/2021       24.3 (L) 85.0 (L)   5/18/2021      5:52 AM 23.5 (L) 119.0 (L)   5/18/2021      5:52 AM  104.0 (L)   5/17/2021       24.8 (L) 177.0   5/16/2021 at 11:59 AM       CT ABDOMEN PELVIS IV CONTRAST, NO ORAL (ER)    Result Date: 5/16/2021  CONCLUSION:   1. Infrarenal abdominal aortic aneurysm measures up to 4.7 cm in diameter.   2. Wedge-shaped low-density in the right kidney is suggestive of a renal infa Gemcitabine and Carboplatin - at [facility]  On C2  No toxicity noted from the chemo  She will be continuing the chemo post discharge at [facility]    2.  Renal infarct  New finding on CT  No other venous thromboembolism history   On anticoagulation with apixaban 10 mg t

## 2021-05-19 NOTE — PLAN OF CARE
A&O x 4. VSS. O2 2L per NC PRN (no home O2). Tele- NSR. Numbness to RLE per baseline. LBP well controlled with Norco PRN. Up with min assist and walker to bathroom, voiding without issue. BM today. SCD's/Started Eliquis this evening.  1200 ml fluid restrict

## 2021-05-19 NOTE — PHYSICAL THERAPY NOTE
PHYSICAL THERAPY QUICK EVALUATION - INPATIENT    Room Number: 362/362-A  Evaluation Date: 5/19/2021  Presenting Problem: right LBP, renal infarct  Physician Order: PT Eval and Treat    Problem List  Principal Problem:    Renal infarct [facility])  Active Probl Sensation: numbness right LE knee to arch of foot       ACTIVITY TOLERANCE                         O2 WALK       AM-PAC '6-Clicks' INPATIENT SHORT FORM - BASIC MOBILITY  How much difficulty does the patient currently have. ..  -   Turning over in bed (inc questions and concerns addressed    ASSESSMENT   Patient is a 66year old female admitted on 5/15/2021 for right LBP, also diagnosed with renal infarct during work up.   Pertinent comorbidities and personal factors impacting therapy include recently diagnos

## 2021-05-19 NOTE — PROGRESS NOTES
Pt cleared by all MD's for discharge. Discharge education completed at bedside with pt and daughter, all questions answered. PIV removed, belongings packed. Script for tylenol-codeine given to patient. Pt discharging via wheelchair.

## 2021-05-19 NOTE — PROGRESS NOTES
[facility]    Nephrology Progress Note    Lolly Dubose Attending:  Jason Wolff MD       SUBJECTIVE:     Feels well, no urinary complaints    PHYSICAL EXAM:     Vital Signs: /59 (BP Location: Left arm)   Pulse 82   Temp 98.2 °F (36.8 °C) ( 05/16/2021    BAPERCENT 0.2 05/16/2021    NE 4.22 05/16/2021    LYMABS 1.20 05/16/2021    MOABSO 0.23 05/16/2021    EOABSO 0.00 05/16/2021    BAABSO 0.01 05/16/2021     Lab Results   Component Value Date    MALBP 19.40 05/18/2021    CREUR 45.00 05/18/2021 lung cancer s/p R partial lung resection 2/2021, spinal stenosis, presented with R lower back pain. Found to have R renal infarct, bilat BRIAN,  Hyponatremia and AAA on imaging.  Nephrology consulted for hyponatremia and JASON:     JASON:   -- resolved, resume li

## 2021-05-19 NOTE — PROGRESS NOTES
[facility] Hospitalist Progress Note     CC: Hospital Follow up    PCP: Jyl Jeans, DO       Assessment/Plan:     Principal Problem:    Renal infarct [facility])  Active Problems:    Closed compression fracture of body of L1 vertebra (HCC)    Back pain:   -ct abd d filed at 5/19/2021 0800  Gross per 24 hour   Intake 840 ml   Output 402 ml   Net 438 ml       Last 3 Weights  05/15/21 2228 : 180 lb (81.6 kg)  04/08/21 1117 : 187 lb (84.8 kg)  03/24/21 1302 : 188 lb 4.8 oz (85.4 kg)      Exam   GEN: NAD  HEENT: EOMI, PER MD on 5/17/2021 at 1:59 PM             Result Date: 5/17/2021  CONCLUSION:  Transitional anatomy at the lumbosacral junction as above.   1. Redemonstration of recent compression deformities of the inferior endplate of G45, superior endplate of L2, and infer

## 2021-05-19 NOTE — DISCHARGE SUMMARY
General Medicine Discharge Summary     Patient ID:  Areta Schlatter  66year old  7/26/1945    Admit date: 5/15/2021    Discharge date and time: 5/19/21  Attending Physician: Corie Rolle MD     Consults: IP CONSULT TO HOSPITALIST  IP CONSULT TO Stephany Resendez by (CST): Kirsty Pal MD on 5/17/2021 at 1:58 PM     Finalized by (CST): Kirsty Pal MD on 5/17/2021 at 1:59 PM             Result Date: 5/17/2021  CONCLUSION:  Transitional anatomy at the lumbosacral junction as above.   1. Redemonstration of recen changed: when to take this        CONTINUE taking these medications    amLODIPine Besylate 5 MG Tabs  Commonly known as: NORVASC  TAKE 1 TABLET BY MOUTH EVERY DAY     Aspirin 81 81 MG Chew  Generic drug: aspirin     dexamethasone 4 MG tablet  Commonly know DC instructions:         Total Time Coordinating Care: Greater than 30 minutes    Patient had opportunity to ask questions and state understand and agree with therapeutic plan as outlined    Thank You,    Mally Barry MD    [facility] Hospitalist  Pager

## 2021-05-25 PROBLEM — R47.81 SLURRED SPEECH: Status: ACTIVE | Noted: 2021-01-01

## 2021-05-25 PROBLEM — N17.9 ACUTE KIDNEY INJURY (HCC): Status: ACTIVE | Noted: 2021-01-01

## 2021-05-25 PROBLEM — E87.2 LACTIC ACIDOSIS: Status: ACTIVE | Noted: 2021-01-01

## 2021-05-25 PROBLEM — R53.1 LEFT-SIDED WEAKNESS: Status: ACTIVE | Noted: 2021-01-01

## 2021-05-25 PROBLEM — N17.9 AKI (ACUTE KIDNEY INJURY) (HCC): Status: ACTIVE | Noted: 2021-01-01

## 2021-05-25 PROBLEM — K92.2 GASTROINTESTINAL HEMORRHAGE, UNSPECIFIED GASTROINTESTINAL HEMORRHAGE TYPE: Status: ACTIVE | Noted: 2021-01-01

## 2021-05-25 PROBLEM — D64.9 ANEMIA, UNSPECIFIED TYPE: Status: ACTIVE | Noted: 2021-01-01

## 2021-05-25 PROBLEM — D69.6 THROMBOCYTOPENIA (HCC): Status: ACTIVE | Noted: 2021-01-01

## 2021-05-25 PROBLEM — I21.4 NON-STEMI (NON-ST ELEVATED MYOCARDIAL INFARCTION) (HCC): Status: ACTIVE | Noted: 2021-01-01

## 2021-05-25 PROBLEM — K92.2 GASTROINTESTINAL HEMORRHAGE: Status: ACTIVE | Noted: 2021-01-01

## 2021-05-25 PROBLEM — E87.20 LACTIC ACIDOSIS: Status: ACTIVE | Noted: 2021-01-01

## 2021-05-25 PROBLEM — R55 SYNCOPE AND COLLAPSE: Status: ACTIVE | Noted: 2021-01-01

## 2021-05-25 NOTE — ED PROVIDER NOTES
Patient Seen in: [facility] Emergency Department      History   Patient presents with:  Syncope    Stated Complaint: syncope    HPI/Subjective:   HPI    Patient is a 78-year-old female presents emergency room with a history of multiple complaints. x2   • KNEE SURGERY Left     knee scope cleaned up   • OTHER SURGICAL HISTORY Bilateral     bilateral wrist cyst removed   • REMOVAL OF LUNG,LOBECTOMY                  Social History    Tobacco Use      Smoking status: Current Every Day Smoker        Packs sounds. There is no hernia. RECTAL: There is black stool which is strongly Hemoccult positive there is no gross blood appreciated. There is melena appreciated. EXTREMITIES: There is no cyanosis, clubbing, or edema appreciated.  Pulses are 2+ and equal in normal limits   POCT GLUCOSE - Abnormal; Notable for the following components:    POC Glucose 193 (*)     All other components within normal limits   CBC W/ DIFFERENTIAL - Abnormal; Notable for the following components:    RBC 2.27 (*)     HGB 6.7 (*) Evidence of ST depression laterally improved from patient's previous EKG                    XR CHEST AP PORTABLE  (CPT=71045)    Result Date: 5/25/2021  CONCLUSION:  Small right pleural fluid and mild pulmonary venous engorgement suggested.   No evidence of the cavernous segment on the right. Multi segmental disease of the left common carotid artery with 50-60% stenosis of the left mid common carotid artery and moderate calcified noncalcified plaque formation.   50% narrowing of the cavernous segment of the r carotid stenosis but no evidence of any acute infarct. A CT scan of the abdomen pelvis shows evidence of stable aortic aneurysm stable renal infarct no evidence of any intra abdominal bleeding appreciated.   Chest x-ray shows evidence of small right pleura extremities with no complaints of any pain at this time Dr. Guadalupe Martinez from [facility] GI has been down to see the patient in the emergency room as well. IV Protonix drip was initiated by GI orders.   Patient's case further discussed with Dr. Alley Holguin from [facility] heme-onc diagnosis)  Anemia, unspecified type  JASON (acute kidney injury) ([facility] [address]  Syncope and collapse  Left-sided weakness  Slurred speech  Non-STEMI (non-ST elevated myocardial infarction) (HCC)  Lactic acidosis     Disposition:  Admit  5/25/2021  1:10 pm    Follow

## 2021-05-25 NOTE — CONSULTS
[facility]  Report of Consultation    Veronika Barrera Patient Status:  Emergency    1945 MRN [id number]   Location [address] [facility] Attending Leamon Epley, MD   [facility] Day # 0 PCP Giuseppe Sow DO     Reason for Consu Mixed hyperlipidemia     Carotid stenosis, bilateral     Malignant neoplasm of lower lobe of right lung (HCC)     Non-small cell lung cancer, right ([address]     Renal infarct ([address]     Closed compression fracture of body of L1 vertebra ([address]     Thrombocytope RASH    Medications:  No current facility-administered medications for this encounter. No current facility-administered medications on file prior to encounter.   acetaminophen 500 MG Oral Tab, Take 500 mg by mouth every 6 (six) hours as needed for Pain as stated above  HEMATOLOGY: denies bruising or excessive bleeding except as stated and denies overt red blood from anywhere  ENDOCRINE: denies unexpected wt gain or wt loss except as stated  ALLERGY/IMM. :medication allergies as above        PHYSICAL EXAM 5/15/2021, 11:39 PM.       INDICATIONS:  syncope, ABDPAIN, BACK PAIN, AAA       TECHNIQUE:  CT scanning was performed from the dome of the diaphragm to the pubic symphysis with non-ionic intravenous contrast material. Post contrast coronal MPR imaging was Impression   CONCLUSION:     1. There is overall no significant interval change in the short interval since previous study. 2. Wedge-shaped areas of decreased enhancement in both kidneys could represent evolving renal infarcts or pyelonephritis.    3. I Neisha  5/25/2021  3:54 PM    Addendum made regarding hx of renal infarct     Basilio Huff MD  [address] [place] [address] Gastroenterology

## 2021-05-25 NOTE — CONSULTS
[facility]  Neurocritical Care Consult Note    Ney Hummel Patient Status:  Emergency    1945 MRN [id number]   Location [address] [facility] Attending Fiorella Gallardo MD   [facility] Day # 0 PCP Verita Angelucci, of education: Not on file      Highest education level: Not on file    Tobacco Use      Smoking status: Current Every Day Smoker        Packs/day: 0.50        Years: 57.00        Pack years: 28.5        Types: Cigarettes        Start date: Julisa Sole General- No acute distress  CV- RRR  Resp- CTA Bilat  Neuro-  · Mental status- awake and alert, regards and follows commands, oriented x3, speech fluent  · Cranial nerves- pupils equally round and reactive to light, extraocular muscles intact, face symme NECK (W IV)(CPT=70496/77551)    Result Date: 5/25/2021  CONCLUSION:  Occlusion of the right common carotid artery from the origin to the carotid bifurcation with reconstitution of the right internal carotid artery above the bifurcation with runoff in the n CUS   A total of 80 minutes of critical care time (exclusive of billable procedures) was administered to manage and/or prevent neurologic instability.  This involved direct patient intervention, complex decision making, and/or extensive discussions with the

## 2021-05-25 NOTE — CONSULTS
[facility] Urology  Full consult to follow. Case discussed with ER physician. Imaging and labs reviewed. Imaging suggestive of bilateral renal infarcts. No hydronephrosis or renal mass. Recommend management as per Vascular Surgery.   No need for any Urologica

## 2021-05-25 NOTE — CONSULTS
[facility]  Report of Consultation    Brissa Elder Patient Status:  Inpatient    1945 MRN [id number]   [facility] 6NE-A Attending Shana Lesches, MD   Hosp Day # 0 PCP Venu Hammond DO     Reason for Consultation: ru Known Problems Daughter    • Arthritis Sister         pt believes it is RA   • Alcohol and Other Disorders Associated Brother    • No Known Problems Daughter    • No Known Problems Daughter    • No Known Problems Daughter    • Alcohol and Other Disorders A Intravenous, Q2H PRN **OR** HYDROmorphone HCl (DILAUDID) 1 MG/ML injection 0.8 mg, 0.8 mg, Intravenous, Q2H PRN  •  ondansetron HCl (ZOFRAN) injection 4 mg, 4 mg, Intravenous, Q6H PRN  •  Metoclopramide HCl (REGLAN) injection 5 mg, 5 mg, Intravenous, Q8H P 11:39 PM.       INDICATIONS:  syncope, ABDPAIN, BACK PAIN, AAA       TECHNIQUE:  CT scanning was performed from the dome of the diaphragm to the pubic symphysis with non-ionic intravenous contrast material. Post contrast coronal MPR imaging was performed. is overall no significant interval change in the short interval since previous study. 2. Wedge-shaped areas of decreased enhancement in both kidneys could represent evolving renal infarcts or pyelonephritis.    3. Infrarenal abdominal aortic aneurysm is d

## 2021-05-25 NOTE — CONSULTS
Pulmonary H&P/Consult     NAME: Gabriella Tomlinson - ROOM: A5/A5 - MRN: [id number] - Age: 66year old - :  1945    Date of Admission: 2021 11:19 AM  Admission Diagnosis: No admission diagnoses are documented for this encounter.     Assessment/Pl another episode today which prompted eval. No report of fever, chills or sweats. Denies nausea. +right lower back pain. In the ED initially hypotensive which improved with fluids. New Hgb of ~6. ABG revealed a metabolic acidosis with a lactate of 14.  CT ab COMMENTS)    Comment:numbness  Niacin                  OTHER (SEE COMMENTS)    Comment:Bright red  Nsaids                  RASH    REVIEW OF SYSTEMS: 12 point review of system negative except per HPI    OBJECTIVE:    Intake/Output Summary (Last 24 hours) a

## 2021-05-25 NOTE — CONSULTS
[facility]    Report of Consultation    Aureliano Ronquillo Patient Status:  Emergency    1945 MRN [id number]   Location [address] [facility] Attending Aidan Yan MD   Hosp Day # 0 PCP Peg Beckett DO     Date of [facility] cigarettes. She started smoking about 58 years ago. She has a 28.50 pack-year smoking history. She has never used smokeless tobacco. She reports that she does not drink alcohol and does not use drugs.     ALLERGIES:       Adhesive Tape           HIVES    Co 5/25/2021 1810  Last data filed at 5/25/2021 1707  Gross per 24 hour   Intake 1373 ml   Output 800 ml   Net 573 ml     Wt Readings from Last 3 Encounters:  05/25/21 : 180 lb (81.6 kg)  05/15/21 : 180 lb (81.6 kg)  04/08/21 : 187 lb (84.8 kg)      General: KETUR Negative 05/25/2021    BLOODURINE Negative 05/25/2021    PHURINE 5.0 05/25/2021    PROUR 30  (A) 05/25/2021    UROBILINOGEN 4.0 (A) 05/25/2021    NITRITE Negative 05/25/2021    LEUUR Negative 05/25/2021    NMIC Microscopic not indicated 05/15/2021 (ER)    Result Date: 5/25/2021  CONCLUSION:  1. There is overall no significant interval change in the short interval since previous study.  2. Wedge-shaped areas of decreased enhancement in both kidneys could represent evolving renal infarcts or pyelonephr urine lytes  -- CT w no hydro, what appears to be evolving bilateral renal infarcts   -- IVFs  -- maintain normotension to increase renal perfusion   -- hold lisinopril   -- avoid nsaids and other nephrotoxins      Acidosis -- due to lactic acidosis.  Meredith Gonzalez

## 2021-05-25 NOTE — ED QUICK NOTES
Katheryn Spangler given message to please Call RT for ABG when patient gets to ICU per Rach Weston MD

## 2021-05-25 NOTE — H&P
[facility]    History & Physical    Catherene Hamming Patient Status:  Emergency    1945 MRN [id number]   Location [address] [facility] [address] [facility] Attending Leamon Epley, MD   [facility] Day # 0 SARI Sow,      Date:  2021 (chronic obstructive pulmonary disease) ([facility] [address]    • Hyperlipidemia    • Hypertension    • Rheumatoid arthritis ([address]    • Stroke [facility]) 2010    RUE/RLE numbness   • Visual impairment     readers     Past Surgical History:   Procedure Laterality Date   • BACK height 5' 2\" (1.575 m), weight 180 lb (81.6 kg), SpO2 97 %.      General: in distress due to dyspnea and pain  HEENT: dry mucous membranes   Neck: +JVD  Respiratory: coarse breath sounds bilaterally, equal and good chest rise, no wheezing  Cardiovascular: discussed with Dr. Rosales Hutson at noon on 5/25/2021. Critical results were read back.     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

## 2021-05-25 NOTE — ED QUICK NOTES
1500: RN to bedside to begin blood transfusion. Patient mentation declined along with increased work of breathing. ED MD called to bedside.

## 2021-05-25 NOTE — ED INITIAL ASSESSMENT (HPI)
2 episodes of syncope today with blurring of vision of right eye and slurred speech. Patient states \"It was like a haze, I couldn't make out anything. \"

## 2021-05-25 NOTE — ED QUICK NOTES
Kelsi Caldwell at bedside to perform central line. Consent signed and on chart. Daughter Carson Brock signed for patient.

## 2021-05-25 NOTE — CONSULTS
ADDENDUM:  The patient was personally seen and examined by me.  I agree with the note written below with the following comments:    S: 67 YO F hx of HTN/HL, CVA, non-small cell lung Ca (lobectomy 2/21, chemotx), AAA, recent renal infarct/ started on apixaba BMP tomorrow to monitor Cr  - Hold BP meds for now    History of Present Illness:  Annamarie Billings is a 66year old female with past medical history significant for CVA, RA, COPD, HTN, HL, h/o squamous cell lung CA (s/p lobectomy and LN dissection on 2/ • Stroke [facility]) 2010    RUE/RLE numbness   • Visual impairment     readers       Past Surgical History:   Procedure Laterality Date   • BACK SURGERY      lower back   •       x2   • KNEE SURGERY Left     knee scope cleaned up   • OTHER SURGICAL (36.4 °C), Min:97.5 °F (36.4 °C), Max:97.5 °F (36.4 °C)    Wt Readings from Last 3 Encounters:  05/25/21 : 180 lb (81.6 kg)  05/15/21 : 180 lb (81.6 kg)  04/08/21 : 187 lb (84.8 kg)      General: Awake and alert; in no acute distress  HEENT: Extraocular mo

## 2021-05-25 NOTE — PROCEDURES
Procedure note - Central line    Procedure:  Central line placement  Indication:  Anemia, shock  Performed by:  Joseph Ritter MD    Description of procedure: After informed consent obtained, area prepped and draped in sterile fashion.   Local anesthetic with

## 2021-05-26 NOTE — PROGRESS NOTES
I had discussed his care w/ Dr Rosales Mcneil yesterday and today    Pt expressed willingness to have endoscopy tomorrow w/ Dr Rosales Mcneil. --clears  --npo mn  --prep tonight for possible egd/colon w/ Jeronimo Christie MD  [address] [place] [address] Gastroenterology  .

## 2021-05-26 NOTE — PLAN OF CARE
Assumed care of patient at 0730. Patient Alert X 4. 5/5 strength in all extremities, though patient states she feels weaker on right. Baseline numbness/tingling on right. Pupils equal and reactive.  No visual deficits, per patient vision seems to be back to patient fatigue and changes in neurological status  - Encourage and assist patient to increase activity and self care with guidance from PT/OT  - Encourage visually impaired, hearing impaired and aphasic patients to use assistive/communication devices  Out neurological status  Outcome: Progressing  Goal: Remains free of injury related to seizure activity  Description: INTERVENTIONS:  - Maintain airway, patient safety  and administer oxygen as ordered  - Monitor patient for seizure activity, document and repo

## 2021-05-26 NOTE — CONSULTS
Boaz Dela Cruz MD.  [facility]  Vascular and Endovascular Surgery     VASCULAR SURGERY   CONSULT NOTE      Name: Ney Shaheed   :   1945  [id number]     2021       REFERRING PHYSICIAN: Tania Moreno DO  PRIMARY CARE Y Q12H  •  polyethylene glycol (GOLYTELY) solution 4,000 mL, 4,000 mL, Oral, Once  •  Pantoprazole Sodium (PROTONIX) 80 mg in sodium chloride 0.9% 100 mL infusion, 8 mg/hr, Intravenous, Continuous  •  acetaminophen (TYLENOL) tab 650 mg, 650 mg, Oral, Q4H PRN Pulse 93   Temp 98.5 °F (36.9 °C) (Temporal)   Resp 26   Ht 5' 2\" (1.575 m)   Wt 180 lb (81.6 kg)   SpO2 95%   BMI 32.92 kg/m²   GENERAL: alert and oriented x3, in no apparent distress  PSYCH: Normal mood and affect  NEURO: Cranial nerves grossly intact. of the brain was performed with multi-planar T1, T2-weighted images with FLAIR sequences and diffusion weighted images without infusion. PATIENT STATED HISTORY: (As transcribed by Technologist)  Patient offered no additional history at this time.    Queen Jayy back pain  TECHNIQUE:  Multiplanar T1 and T2 weighted images including fat suppression sequences. Images acquired in sagittal and axial planes. Intravenous gadolinium was administered followed by multiplanar post-infusion T1 weighted sequences.    PATIENT degenerative endplate marrow signal changes noted. The distal spinal cord and conus medullaris have a normal signal and morphology. The conus medullaris terminates at the upper L2 level. The roots of the cauda equina are unremarkable.   No focal mass or stenosis. CONCLUSION:  Transitional anatomy at the lumbosacral junction as above. 1. Redemonstration of recent compression deformities of the inferior endplate of T98, superior endplate of L2, and inferior endplate of L4 as above.   2. On the s is again noted.     Dictated by (CST): Mika Nj MD on 5/26/2021 at 9:57 AM     Finalized by (CST): Mika Nj MD on 5/26/2021 at 9:59 AM       XR CHEST AP PORTABLE  (CPT=71045)    Result Date: 5/25/2021  PROCEDURE:  XR CHEST AP PORTABLE  (CP 5/25/2021 at 4:42 PM       XR CHEST AP PORTABLE  (CPT=71045)    Result Date: 5/25/2021  PROCEDURE:  XR CHEST AP PORTABLE  (CPT=71045)  TECHNIQUE:  AP chest radiograph was obtained. COMPARISON:  None.   INDICATIONS:  syncope  PATIENT STATED HISTORY: (As tra No acute intracranial hemorrhage or hydrocephalus. 2. Mild chronic small vessel ischemic disease. Small chronic appearing infarct within the right cerebellum.  If there is clinical concern for acute ischemia/infarction, an MRI of the brain would be recomme of 5 x 4.2 cm (previously 4.7 x 4.4 cm). There is extensive irregular ulcerated plaque throughout the abdominal aorta. RETROPERITONEUM:  No mass or adenopathy. BOWEL/MESENTERY:  No visible mass, obstruction, or bowel wall thickening.   ABDOMINAL WALL:  No USED:  80cc of Visipaque 320  FINDINGS:  LIVER:  No enlargement, atrophy, abnormal density, or significant focal lesion. BILIARY:  No visible dilatation or calcification. PANCREAS:  No lesion, fluid collection, ductal dilatation, or atrophy.   SPLEEN:  No compression deformity is noted. If patient has pain in this region a follow-up MRI lumbar spine exam may be done. This critical result was discussed with Dr. Leslie Humphries at [address] hours on 5/16/2021. Read back was performed.      Dictated by (CST): Edda Ruiz normal.  The carotid bifurcation is patent. Normal internal carotid arterial runoff within the neck. There is code dominance of the vertebral arteries arising from the subclavian arteries. No obstructing lesions. Normal vertebrobasilar junction.   Esteban Horne admission and last admission. There has been no change. Her worsening JASON is likely a result of global hypoperfusion in the setting of existing stenosis.  Will defer any intervention on renal arteries or AAA at this point until all her other acute issues ar

## 2021-05-26 NOTE — DIETARY NOTE
[facility]   CLINICAL NUTRITION    Shelia Huggins Vee     Admitting diagnosis:  Syncope and collapse [R55]  Lactic acidosis [E87.2]  Slurred speech [R47.81]  Non-STEMI (non-ST elevated myocardial infarction) ([facility] [address] [I21.4]  Left-sided weakness [R53.1]

## 2021-05-26 NOTE — PHYSICAL THERAPY NOTE
Physical Therapy    Received order for PT evaluation. Brain MRI pending. Spoke with RN. Hold therapy for now. MD consultations pending. PT will initiate evaluation once the patient is medically stable.

## 2021-05-26 NOTE — OCCUPATIONAL THERAPY NOTE
Received order for OT evaluation. Brain MRI pending. Spoke with RN. Hold therapy for now. MD consultations pending. OT will initiate evaluation once the patient is medically stable.

## 2021-05-26 NOTE — PROGRESS NOTES
[facility]  Neurocritical Care Progress Note     Toy Villanueva Patient Status:  Inpatient    1945 MRN [id number]   [facility] 6NE-A Attending Shama Hidalgo DO   Hosp Day # 1 PCP DO Gibson aMrt Intravenous, Q8H PRN  ipratropium-albuterol (DUONEB) nebulizer solution 3 mL, 3 mL, Nebulization, Q4H PRN  HYDROmorphone HCl (DILAUDID) 1 MG/ML injection 0.2 mg, 0.2 mg, Intravenous, Q2H PRN   Or  HYDROmorphone HCl (DILAUDID) 1 MG/ML injection 0.4 mg, 0.4 (CST): Jaymie Dove MD on 5/25/2021 at 4:42 PM       Lab Review     Recent Labs   Lab 05/25/21  1204 05/26/21  0432    138   K 4.5 5.0    111   CO2 18.0* 20.0*   * 122*   BUN 53* 57*   CREATSERUM 1.77* 2.01*     Recent Labs   Lab 05/2

## 2021-05-26 NOTE — CONSULTS
[facility] Urology   Consultation Note    Gabriella Bushra Patient Status:  Inpatient    1945 MRN [id number]   [facility] 6NE-A Attending Amelia Ortega DO   Hosp Day # 1 PCP Jacky Zapien DO     Reason for [facility] LUNG,LOBECTOMY       Family History   Problem Relation Age of Onset   • Stroke Father    • Diabetes Mother    • Hypertension Mother    • No Known Problems Daughter    • Arthritis Sister         pt believes it is RA   • Alcohol and Other Disorders Associate HYDROmorphone HCl (DILAUDID) 1 MG/ML injection 0.2 mg, 0.2 mg, Intravenous, Q2H PRN **OR** HYDROmorphone HCl (DILAUDID) 1 MG/ML injection 0.4 mg, 0.4 mg, Intravenous, Q2H PRN **OR** HYDROmorphone HCl (DILAUDID) 1 MG/ML injection 0.8 mg, 0.8 mg, Intravenous atrophy.     SPLEEN:  No enlargement or focal lesion.     KIDNEYS:  There are wedge-shaped areas of decreased enhancement in both kidneys which were described previously.  Differential considerations include evolving renal infarcts or pyelonephritis.    ADR pulmonary disease, unspecified COPD type ([facility] [address]     Benign essential hypertension     Mixed hyperlipidemia     Carotid stenosis, bilateral     Malignant neoplasm of lower lobe of right lung (HCC)     Non-small cell lung cancer, right ([address]     Renal infarct

## 2021-05-26 NOTE — ANESTHESIA PREPROCEDURE EVALUATION
PRE-OP EVALUATION    Patient Name: Moniuqe Stevenson    Admit Diagnosis: Syncope and collapse [R55]  Lactic acidosis [E87.2]  Slurred speech [R47.81]  Non-STEMI (non-ST elevated myocardial infarction) ([facility] [address] [I21.4]  Left-sided weakness [R53.1]  JASON (acute mg, Intravenous, Q10 Min PRN  hydrALAzine HCl (APRESOLINE) injection 10 mg, 10 mg, Intravenous, Q2H PRN  ondansetron HCl (ZOFRAN) injection 4 mg, 4 mg, Intravenous, Q6H PRN   Or  Metoclopramide HCl (REGLAN) injection 5 mg, 5 mg, Intravenous, Q8H PRN  [COMP , 5/25/2021 at 0900  [START ON 5/27/2021] apixaban 5 MG Oral Tab, Take 5 mg by mouth 2 (two) times daily.  Take 2 tabs (10mg) by mouth twice daily for 7 days, then take 1 tab (5mg) by mouth twice daily thereafter. , Disp: , Rfl:         Allergies: Adhesive .0 (L) 05/26/2021     03/16/2021     Lab Results   Component Value Date     05/26/2021     03/26/2021    K 5.0 05/26/2021    K 4.36 03/26/2021     05/26/2021     03/26/2021    CO2 20.0 (L) 05/26/2021    CO2 29.4 (H) 03

## 2021-05-26 NOTE — PLAN OF CARE
Assumed care of pt at [address] [facility]. Pt neuro intact, A&Ox4, continues to have R sided tingling, which is baseline. Pt remains on 2L NC, NSR, VSS. RIJ CVC w/ protonix gtt. Pt has baseline of continuous pain at home.  Troponin elevated and resulted as 88 when redrawn

## 2021-05-26 NOTE — CONSULTS
[facility]  Report of Consultation    Emelinamalini Malisa Patient Status:  Inpatient    1945 MRN [id number]   [facility] 6NE-A Attending Sharifa Chavez MD   [address] Day # 1 PCP Daly Marr DO     ADMIT DATE AND TIME:  Getting Echo at this time        PERTINENT HISTORY:     History:  Past Medical History:   Diagnosis Date   • AAA (abdominal aortic aneurysm) ([facility] [address]    • Chronic back pain    • COPD (chronic obstructive pulmonary disease) ([facility] [address]    • Hyperlipidemia    • H rectal suppository 650 mg, 650 mg, Rectal, Q4H PRN  •  Labetalol HCl (TRANDATE) injection 10 mg, 10 mg, Intravenous, Q10 Min PRN  •  hydrALAzine HCl (APRESOLINE) injection 10 mg, 10 mg, Intravenous, Q2H PRN  •  ondansetron HCl (ZOFRAN) injection 4 mg, 4 mg 18 mmol/L    BUN 53 (H) 7 - 18 mg/dL    Creatinine 1.77 (H) 0.55 - 1.02 mg/dL    BUN/CREA Ratio 29.9 (H) 10.0 - 20.0    Calcium, Total 8.8 8.5 - 10.1 mg/dL    Calculated Osmolality 299 (H) 275 - 295 mOsm/kg    GFR, Non- 27 (L) >=60    GFR, Atrial rate 82 BPM    P-R Interval 156 ms    QRS Duration 96 ms    Q-T Interval 382 ms    QTC Calculation (Bezet) 446 ms    P Axis 58 degrees    R Axis -30 degrees    T Axis 162 degrees   RAPID SARS-COV-2 BY PCR    Collection Time: 05/25/21 12:16 PM    Spe URINALYSIS WITH CULTURE REFLEX    Collection Time: 05/25/21  4:45 PM    Specimen: Urine   Result Value Ref Range    Urine Color Yellow Yellow    Clarity Urine Hazy (A) Clear    Spec Gravity 1.026 1.001 - 1.030    Glucose Urine Negative Negative mg/dL ACID, PLASMA    Collection Time: 05/25/21  9:20 PM   Result Value Ref Range    Lactic Acid 3.1 (H) 0.4 - 2.0 mmol/L   PREPARE RBC    Collection Time: 05/25/21  9:37 PM   Result Value Ref Range    Blood Product [id number]     Unit Number [id number]     U MANUAL DIFFERENTIAL    Collection Time: 05/26/21  4:31 AM   Result Value Ref Range    Neutrophil Absolute Manual 5.85 1.50 - 7.70 x10(3) uL    Lymphocyte Absolute Manual 1.54 1.00 - 4.00 x10(3) uL    Monocyte Absolute Manual 0.31 0.10 - 1.00 x10(3) uL Range    POC Glucose 117 (H) 70 - 99 mg/dL   EKG 12-LEAD    Collection Time: 05/26/21  7:36 AM   Result Value Ref Range    Ventricular rate 81 BPM    Atrial rate 81 BPM    P-R Interval 168 ms    QRS Duration 88 ms    Q-T Interval 360 ms    QTC Calculation bilateral subarticular zone stenosis at L4-5. Mild spinal canal stenosis at L3-4.  5. Mild to moderate bilateral neural foraminal stenosis at L3-4. Moderate right and mild-to-moderate left neural foraminal stenosis at L4-5.   Moderate bilateral neural for chronic appearing infarct within the right cerebellum. If there is clinical concern for acute ischemia/infarction, an MRI of the brain would be recommended for further evaluation. Critical results were discussed with Dr. Connie Arrieta at noon on 5/25/2021.  Cri (CST): Swteha Jackson MD on 5/16/2021 at 0:14 AM       CT STROKE CTA BRAIN/CTA NECK (W IV)(CPT=70496/92696)    Result Date: 5/25/2021  CONCLUSION:  Occlusion of the right common carotid artery from the origin to the carotid bifurcation with reconstitut hemorrhage     Gastrointestinal hemorrhage, unspecified gastrointestinal hemorrhage type     Anemia, unspecified type     JASON (acute kidney injury) ([facility] [address]     Syncope and collapse     Left-sided weakness     Slurred speech     Non-STEMI (non-ST elevated tato

## 2021-05-26 NOTE — CM/SW NOTE
05/26/21 1500   CM/SW Referral Data   Referral Source    Reason for Referral Discharge planning   Informant Patient   Patient Info   Patient's Mental Status Alert;Oriented   Patient lives with Spouse   Discharge Needs   Anticipated D/C needs

## 2021-05-26 NOTE — SLP NOTE
Attempted eval, patient occupied with testing currently and has multiple tests today. Patient also with ongoing GI workup. Will hold at this time and follow up to complete evaluation once cleared for po and clinically appropriate.     Yoly Grier MS C

## 2021-05-26 NOTE — PROGRESS NOTES
[facility]  Progress Note    Monique Stevenson Patient Status:  Inpatient    1945 MRN [id number]   [facility] 6NE-A Attending Shima Yanez MD   [facility] Day # 1 PCP Faisal Cornejo DO     Subjective:  Monique Stevenson is a 05/25/21  1204 05/25/21  2120 05/26/21  0431   RBC 2.27*  --  2.83*   HGB 6.7*   < > 8.1*   HCT 20.8*  --  25.3*   MCV 91.6  --  89.4   MCH 29.5  --  28.6   MCHC 32.2  --  32.0   RDW 16.2*  --  16.2*   NEPRELIM 4.93  --  5.27   WBC 7.1  --  7.7   . 0 kidney injury) ([facility] [address]     Syncope and collapse     Left-sided weakness     Slurred speech     Non-STEMI (non-ST elevated myocardial infarction) (HCC)     Lactic acidosis      1 acute blood loss anemia  2 anticoagulated  3 elevated troponin  4 recent renal i

## 2021-05-26 NOTE — PLAN OF CARE
SCr bumped to 2.01 mg/dL; estimated CrCl 18.5 mL/min with diminished UOP. Zosyn adjusted to 3.375 grams IV every 12 hours per protocol.     Myaur Gonzales, PharmD  05/26/21 8:14 AM

## 2021-05-26 NOTE — PROGRESS NOTES
ADDENDUM:  The patient was personally seen and examined by me. I agree with the note written below with the following comments:    S: clinically, more stable today; free of chest pain, resting dyspnea. main complaint is buttock/R calf pain.   trop has rise pain or dyspnea at this time.     Objective:  /55   Pulse 73   Temp 97.8 °F (36.6 °C)   Resp 19   Ht 5' 2\" (1.575 m)   Wt 180 lb (81.6 kg)   SpO2 97%   BMI 32.92 kg/m²   Temp (24hrs), Av.5 °F (36.4 °C), Min:96.1 °F (35.6 °C), Max:98.4 °F (36.9 °C Continuous        Laboratory Data:  Lab Results   Component Value Date    WBC 7.7 05/26/2021    HGB 8.1 05/26/2021    HCT 25.3 05/26/2021    .0 05/26/2021     Lab Results   Component Value Date    INR 2.77 (H) 05/26/2021    INR 2.84 (H) 05/25/2021

## 2021-05-26 NOTE — PROGRESS NOTES
[facility] Hospitalist Progress Note   CC: follow-up hospital admission - syncope     SUBJECTIVE:  Interval History:   - this am reports back pains have improved, has dry cough and wheezing, but tells me this isn't unusual for her given her smoking/ copd  - she d 108 111   CO2 18.0* 20.0*   BUN 53* 57*   MG  --  2.4   ALT 83* 231*   * 390*   ALB 3.0* 2.7*       Coagulation:   Lab Results   Component Value Date    PROTIME 10.2 01/05/2021    INR 2.77 (H) 05/26/2021    PTT 57.8 (H) 05/25/2021     ASSESSMENT/LESLEE chemo  Active Smoker   Compression fractures  COPD  eHTN  HLD     Plan  SIRS possible Sepsis w/ multiple organ system failure   Severe lactic acidosis  - lactic acidosis improving w/ fluids and blood products  - there is no current overt bleeding, anemia m fractures  - pain mgmt w/ norco prn, iv dilaudid prn      eHTN  - hold home anti htn agents for now     HLD  - allergy to statin  - hold home meds    Dispo: icu    Kizzy Angel [facility] Hospitalist  Pager: [phone]  Answering Service: [phone]

## 2021-05-26 NOTE — PROGRESS NOTES
[address] Patient Status:  Inpatient    1945 MRN [id number]   [facility] 6NE-A Attending Hamilton Diego MD   [facility] Day # 1 PCP Ruby Armando DO     Critical Care Progress Note     Date of Admission: 97.9 °F (36.6 °C) (Temporal)   Resp 19   Ht 5' 2\" (1.575 m)   Wt 180 lb (81.6 kg)   SpO2 96%   BMI 32.92 kg/m²    on 3-4L nc      Wt Readings from Last 3 Encounters:  05/25/21 : 180 lb (81.6 kg)  05/15/21 : 180 lb (81.6 kg)  04/08/21 : 187 lb (84.8 kg) increase significantly c/w NSTEMI- concern that there may be additional pattern of injury- repeat EKG.   Echocardiogram pending.  - no obvious abdominal process at this point  - concern for evolving renal infarct vs pyelonephritis on imaging  - gen surg and

## 2021-05-27 NOTE — PHYSICAL THERAPY NOTE
Physical Therapy    Orders for PT eval received. Pt is scheduled for EGD this a.m. Will re-attempt as schedule and pt's medical stability allow.

## 2021-05-27 NOTE — PROGRESS NOTES
[address] Patient Status:  Inpatient    1945 MRN [id number]   [facility] 6NE-A Attending Rosa Isela Addison DO   Hosp Day # 2 PCP Danuta Nicholas DO     Critical Care Progress Note     Date of Admission:  ringers infusion, , Intravenous, Continuous     OBJECTIVE:  /60   Pulse 90   Temp 98.2 °F (36.8 °C) (Temporal)   Resp 26   Ht 5' 2\" (1.575 m)   Wt 176 lb 5.9 oz (80 kg)   SpO2 95%   BMI 32.26 kg/m²      Ventilator Settings: 2L      Wt Readings from agree with the radiology interpretation except where noted. ASSESSMENT/PLAN:  1. Lactic acidosis: 2/2 global hypoperfusion from acute blood loss anemia and possible cardiogenic component leading to end organ infarction (renal infarct).  Sepsis less li

## 2021-05-27 NOTE — PLAN OF CARE
Assumed care of patient at 0730. Patient alert X 4. NSR on monitor. Lungs clear.dim. maintains O2 on 2l NC. Patient down to GI lab at 1000. Received back at 1215. Protonix gtt. Off. Russo remains in place. Chronic pain, declines need for medication.  To rem CARDIOVASCULAR - ADULT  Goal: Maintains optimal cardiac output and hemodynamic stability  Description: INTERVENTIONS:  - Monitor vital signs, rhythm, and trends  - Monitor for bleeding, hypotension and signs of decreased cardiac output  - Evaluate effectiv

## 2021-05-27 NOTE — ANESTHESIA POSTPROCEDURE EVALUATION
[address] Patient Status:  Inpatient   Age/Gender 66year old female MRN [id number]   Location [id number] [place] [address] Attending Rosy De Santiago, [address] [facility] Day # 2 PCP Mary Nava DO       Anesthesia Post-op No

## 2021-05-27 NOTE — OPERATIVE REPORT
ENDOSCOPY OPERATIVE REPORT    Patient Name:  Mabel Renae  Medical Record #: [id number]  YOB: 1945  Date of Procedure: 5/27/2021    Preoperative Diagnosis:  Heme (+) stool, occult blood loss anemia     Postoperative Diagnosis:   1) smal well as retroverted views of the fundus had a diffuse, mild, non bleeding gastritis. A couple 5 mm erosions noted in antrum, non bleeding.   Biopsies from the edge of the erosions as well as the antrum, body, and fundus were taken for H.pylori and for hist anticoagulation needed to be restarted immediately, okay to restart.  If okay to wait a couple days, this may reduce rebleeding risk    Okay for clears    I called pt daughter Ty Busby) after procedure to discuss results/recommendations     Illa Schwab,

## 2021-05-27 NOTE — PLAN OF CARE
VSS, NSR c ectopy on monitor. 2L NC. NPO @ MN for EGD/colonoscopy this am. Bowel prep given last night c multiple Bms. UOP remains low. Neuro checks q4h and WNL. No weakness noted to R side. Pt c/o chronic back pain but refused meds. Protonix gtt infusing.

## 2021-05-27 NOTE — PROGRESS NOTES
[facility] Hospitalist Progress Note   CC: follow-up hospital admission - syncope     SUBJECTIVE:  Interval History:   - s/p scopes, she feels fatigued, denies chest pain, dyspnea    OBJECTIVE:  Scheduled Meds:   • piperacillin-tazobactam  3.375 g Intravenous Q12 20.0*   BUN 53* 57* 53*   MG  --  2.4 2.3   ALT 83* 231* 241*   * 390* 333*   ALB 3.0* 2.7* 2.6*       Coagulation:   Lab Results   Component Value Date    PROTIME 10.2 01/05/2021    INR 1.88 (H) 05/27/2021    PTT 57.8 (H) 05/25/2021     ASSESSMENT/ w/ acute worsening along w/ vision changes  Elevated LFTs  Lung CA s/p wedge resection on chemo  Active Smoker   Compression fractures  COPD  eHTN  HLD     Plan  SIRS possible Sepsis w/ multiple organ system failure   Severe lactic acidosis  - lactic acido -mgmt per neuro     Elevated LFTs  - likely related to hypoperfusion   - trend lft      Lung CA s/p wedge resection on chemo  Active Smoker   COPD - by pfts - was not on bronchodilators at home   - pulm consulted, nebs prn, resp failure multifactorial li

## 2021-05-27 NOTE — OCCUPATIONAL THERAPY NOTE
OT orders received, chart reviewed. Patient scheduled for EGD this am. Will hold at this time and reattempt as able and as patient appropriate.

## 2021-05-27 NOTE — PROGRESS NOTES
[facility] [facility]  Neurocritical Care Progress Note     Danisha Grief Patient Status:  Inpatient    1945 MRN [id number]   [facility] 6NE-A Attending Tigist Jones DO   Hosp Day # 2 PCP DO Georges Candelaria Intravenous, Q8H PRN  ipratropium-albuterol (DUONEB) nebulizer solution 3 mL, 3 mL, Nebulization, Q4H PRN  HYDROmorphone HCl (DILAUDID) 1 MG/ML injection 0.2 mg, 0.2 mg, Intravenous, Q2H PRN   Or  HYDROmorphone HCl (DILAUDID) 1 MG/ML injection 0.4 mg, 0.4 atelectasis and/or scarring. Elevation of the right hemidiaphragm is again noted.     Dictated by (CST): Jeannette Martínez MD on 5/26/2021 at 9:57 AM     Finalized by (CST): Jeannette Martínez MD on 5/26/2021 at 9:59 AM       [address] Eh Maria MD  Medical Director of System Neurosciences  Chief, Section of [address]

## 2021-05-27 NOTE — PROGRESS NOTES
Vascular Surgery Progress Note    /68   Pulse 87   Temp 97.7 °F (36.5 °C) (Temporal)   Resp 21   Ht 5' 2\" (1.575 m)   Wt 176 lb 5.9 oz (80 kg)   SpO2 97%   BMI 32.26 kg/m²     Recent Labs   Lab 05/25/21  1204 05/25/21  2120 05/26/21  0431 05/27/21

## 2021-05-27 NOTE — PROGRESS NOTES
[facility]    Nephrology Progress Note    Skinny Adair Attending:  Catie Gan DO     Cc: JASON    SUBJECTIVE     PRBC this am  Sp EGD/colonoscopy today  No other complaints     PHYSICAL EXAM   Vital signs: /67   Pulse 87   Temp 97.7 ° (DILAUDID) 1 MG/ML injection 0.8 mg, 0.8 mg, Intravenous, Q2H PRN  lactated ringers infusion, , Intravenous, Continuous        LABS     Lab Results   Component Value Date    WBC 7.9 05/27/2021    HGB 9.0 05/27/2021    HCT 24.0 05/27/2021    .0 05/27 carotid bulb and internal carotid artery reconstitutes as     was seen on the previous CTA. This is not seen well on the ultrasound     exam because of the limitations as     described above.          There is calcified irregular plaque identified involvin stenosis at the left carotid bulb. 3. There is known occlusion of the right common carotid artery as seen on     previous CTA, however technical factors related to bandages limited     sonographic evaluation of the right carotid artery.      4. Antegrade abnormality identified. 2. Minimal chronic microvascular ischemic changes in the cerebral white     matter. Old lacunar infarcts in the thalami and basal ganglia.                Dictated by (CST): Emma Herrera MD on 5/26/2021 at 2:33 PM         F the level of the mid SVC, appearing     adequately positioned. Stable borderline cardiomegaly. Lungs are     essentially clear. Pulmonary vasculature is     within normal limits                               =====    CONCLUSION:      1.  Interval placeme Limited due to lordotic positioning. No definite evidence of acute airspace consolidation.                               =====    CONCLUSION:  Small right pleural fluid and mild pulmonary venous     engorgement suggested.   No evidence of cardiac enlar maximum axial dimensions of 5 x 4.2 cm (previously 4.7 x 4.4 cm). There is extensive irregular ulcerated plaque throughout the abdominal     aorta. RETROPERITONEUM:  No mass or adenopathy.       BOWEL/MESENTERY:  No visible mass, obstruction, or bow reduction techniques were used. Dose information is transmitted to the [facility]     [facility] [employer] of Radiology) NRDR ([address])     which includes the Dose Index     Registry.          PATIENT STATED HISTORY:(As transcribed by [place] communicating arteries.                          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 The basal cisterns are patent. The gray-white matter differentiation is intact. There is no acute intracranial hemorrhage or extra-axial fluid collection. There is no evident fracture.   The vi PRBCs, GI on consult   Hypotension -- IVFs. Pressors if needed.  CXR noted  BRIAN / Renal infarct / AAA -- was seen by vascular and was on apixaban  NSTEMI --  Echocardiogram with EF: 20% G1DD, PASP: 40-45mmHg, +RWMA  Plan for cath tomorrow      D/w SAMMI Delaney

## 2021-05-27 NOTE — PROGRESS NOTES
[facility]  Progress Note    Syed Chilel Patient Status:  Inpatient    1945 MRN [id number]   [facility] 6NE-A Attending Julian Sullivan DO   Hosp Day # 2 PCP Justino Kaur DO     Subjective:  Syed Chilel is a(n) edema  Neurologic: Grossly normal          Labs:     Recent Labs   Lab 05/25/21  1204 05/25/21  2120 05/26/21  0431 05/26/21  0431 05/27/21  0435   RBC 2.27*  --  2.83*   < > 2.64*   HGB 6.7*   < > 8.1*   < > 7.6*   HCT 20.8*  --  25.3*   < > 24.0*   MCV 9 injury [facility])     Gastrointestinal hemorrhage     Gastrointestinal hemorrhage, unspecified gastrointestinal hemorrhage type     Anemia, unspecified type     JASON (acute kidney injury) ([facility] [address]     Syncope and collapse     Left-sided weakness     Slurred speech

## 2021-05-27 NOTE — SLP NOTE
Patient s/p EGD/colonoscopy today; Pt resting/sleepy. Pt passed RN dysphagia screen. MRI negative for stroke. Pt NPO after midnight for [facility] tomorrow. SLP to follow up at another time when able.

## 2021-05-27 NOTE — PROGRESS NOTES
[facility]  Progress Note    Brissa Elder Patient Status:  Inpatient    1945 MRN [id number]   [facility] 6NE-A Attending Shana Lesches, MD   [address] Day # 2 PCP Venu Hammond DO     Impression:  1.  NSTE Or  acetaminophen (TYLENOL) 650 MG rectal suppository 650 mg, 650 mg, Rectal, Q4H PRN  Labetalol HCl (TRANDATE) injection 10 mg, 10 mg, Intravenous, Q10 Min PRN  hydrALAzine HCl (APRESOLINE) injection 10 mg, 10 mg, Intravenous, Q2H PRN  ondansetron HCl (ZO

## 2021-05-28 NOTE — PHYSICAL THERAPY NOTE
PHYSICAL THERAPY EVALUATION - INPATIENT     Room Number: 1369/3906-O  Evaluation Date: 5/28/2021  Type of Evaluation: Initial  Physician Order: PT Eval and Treat    Presenting Problem: NSTEMI, gi hemorrhage  Reason for Therapy: Mobility Dysfunction a •       x2   • COLONOSCOPY N/A 2021    Procedure: COLONOSCOPY;  Surgeon: Jessika Gao MD;  Location: [address] [place] [address] ENDOSCOPY   • KNEE SURGERY Left     knee scope cleaned up   • OTHER SURGICAL HISTORY Bilateral     bilateral wrist cyst removed difficulty does the patient currently have. ..  -   Turning over in bed (including adjusting bedclothes, sheets and blankets)?: None   -   Sitting down on and standing up from a chair with arms (e.g., wheelchair, bedside commode, etc.): A Little   -   Movin NSTEMI, gi bleed. Pertinent comorbidities and personal factors impacting therapy include L1 compression fracture, lung ca s/p wedge resection, HTN, COPD, RA, CVA w/ r sided weakness, AAA, tobacco abuse.   In this PT evaluation, the patient presents with th

## 2021-05-28 NOTE — PROCEDURES
[address] Location: Cath Lab    CSN [id number] MRN [id number]   Admission Date 5/25/2021 Procedure Date 5/28/2021   Attending Physician Nate Handy DO Procedure Physician Esperanza Thakkar MD         CARDIAC CATHETERIZATION the apex and gives rise to a mid, medium diagonal branch. The proximal LAD demonstrates a hazy 80-90% lesion that appears suspicious for thrombus.  Moderate diffuse disease elsewhere, distal flow is TIMI3.      LCx: The left circumflex artery is a medium si

## 2021-05-28 NOTE — PLAN OF CARE
Assumed care of patient at 0730. Alert and oriented x4. Received pt from cath lab with TR band in place. Air removed per orders. No bleeding, c/d/I, soft no hematoma. Heparin gtt started for ACS protocol. Tolerating diet. Up to chair for approx 5 hours.

## 2021-05-28 NOTE — OCCUPATIONAL THERAPY NOTE
OCCUPATIONAL THERAPY EVALUATION - INPATIENT     Room Number: 7124/9305-X  Evaluation Date: 5/28/2021  Type of Evaluation: Initial  Presenting Problem: syncope, GI hemorrhage, NSTEMI    Physician Order: IP Consult to Occupational Therapy  Reason for Therapy Procedure Laterality Date   • BACK SURGERY      lower back   •       x2   • COLONOSCOPY N/A 2021    Procedure: COLONOSCOPY;  Surgeon: Cheryl Diaz MD;  Location: [facility] ENDOSCOPY   • KNEE SURGERY Left     knee scope cleaned up   • OTHER S Putting on and taking off regular lower body clothing?: A Lot  -   Bathing (including washing, rinsing, drying)?: A Lot  -   Toileting, which includes using toilet, bedpan or urinal? : A Little  -   Putting on and taking off regular upper body clothing?: A ability to participate in ADL, instrumental activities of daily living, rest and sleep, work, leisure and social participation. Recommend discharge home with home OT.      The patient is functioning below her previous functional level and would benefit fro

## 2021-05-28 NOTE — PROGRESS NOTES
[facility]  Progress Note    Selin Danielle Patient Status:  Inpatient    1945 MRN [id number]   [facility] 6NE-A Attending Adele Shea DO   Hosp Day # 2 PCP Ramses Sotomayor DO     ADMISSION DATE AND TIME: 2021 11:19 308 (H) 275 - 295 mOsm/kg    GFR, Non- 28 (L) >=60    GFR, -American 33 (L) >=60     (H) 15 - 37 U/L     (H) 13 - 56 U/L    Alkaline Phosphatase 126 55 - 142 U/L    Bilirubin, Total 0.4 0.1 - 2.0 mg/dL    Total Protein Collection Time: 05/27/21  7:52 AM   Result Value Ref Range    Blood Product [id number]     Unit Number [id number]     UNIT ABO A     UNIT RH POS     Product Status Issued     Expiration Date [id number]     Blood Type Barcode 6200    POCT GLUCOSE    C

## 2021-05-28 NOTE — PROGRESS NOTES
[facility] Hospitalist Progress Note   CC: follow-up hospital admission - syncope     SUBJECTIVE:  Interval History:   - pt sitting in chair, some pain L1/L2. Russo in place. 123/69.  On heparin gtt    OBJECTIVE:  Scheduled Meds:   • metoprolol succinate  12.5 mg 5. 8   RBC 2.27*  --  2.83* 2.64*  --  3.06* 3.05*   HGB 6.7*   < > 8.1* 7.6* 9.0* 9.0* 8.6*   HCT 20.8*  --  25.3* 24.0*  --  27.5* 27.3*   .0*  --  126.0* 232.0  --  321.0 333.0    < > = values in this interval not displayed.        Chem:   Recent L progressively dyspneic.  ABG obtained demonstrating severe metabolic acidosis w/ a lactic acid of 14.     Syncope   SIRS possible Sepsis w/ multiple organ system failure   Acute respiratory failure   Severe lactic acidosis  NSTEMI  Acute systolic CHF   Acut above      Renal infarcts - on eliquis pta  - see above, uro / vasc consulted, will need cad/nstemi/chf addressed       Syncope   - ? 2/2 cardiac etiology vs hypovolemia, less likely PE as she was compliant on eliquis      Hx of CVA w/ chronic right sided

## 2021-05-28 NOTE — PLAN OF CARE
Around 2200 pt reports mild SOB, RR 20's, breath sounds diminished, crackles, exp wheezing bilaterally. Neph paged to address fluids. LR rate decresed from 100 ml/h to 50 ml/hr, and ordered to stop fluids if resp status worsens.  Pt tolerating LR @50 ml/h

## 2021-05-28 NOTE — CM/SW NOTE
PT recommending [facility]. Referrals sent to several [place] [address] agencies. Await responses. CM/SW will follow up with patient to discuss choices.

## 2021-05-28 NOTE — PROGRESS NOTES
Vascular Surgery Progress Note    /63   Pulse 100   Temp 97.8 °F (36.6 °C) (Temporal)   Resp 17   Ht 5' 2\" (1.575 m)   Wt 176 lb 5.9 oz (80 kg)   SpO2 96%   BMI 32.26 kg/m²     Recent Labs   Lab 05/26/21  0431 05/26/21  0431 05/27/21  0435 05/27/21

## 2021-05-28 NOTE — CONSULTS
Cardiothoracic Surgery  Heart Consult       Referring: Dr Lynn Zuñiga  PCP: Dr Arnel Guerrero  Reason for consult: severe CAD and EF 21%  66year old female admitted 5/25/21 with 2 episodes of syncope over past few days, she did have transient slurred speech and blurry    the normal range. There was no evidence for stenosis. There was trivial      regurgitation. 4. Left atrium: The left atrial volume was mildly increased. 5. Right ventricle: The cavity size was mildly decreased.    6. Pulmonary arteries: Systolic pr fatigue  Eyes: normal  Ears, nose, mouth, throat: normal  Cardiovascular: negative for chest pain, negative for orthopnea, PND, lower extremity edema  Pulmonary: negative for SOB, HERRERA  Gastrointestinal: denies nausea, vomiting, diarrhea, or abdominal pain

## 2021-05-28 NOTE — PROGRESS NOTES
[address] Patient Status:  Inpatient    1945 MRN [id number]   [facility] 6NE-A Attending Catie Gan DO   Hosp Day # 3 PCP Fouzia Ba DO     Critical Care Progress Note     Date of Admission:  (DUONEB) nebulizer solution 3 mL, 3 mL, Nebulization, Q4H PRN  •  HYDROmorphone HCl (DILAUDID) 1 MG/ML injection 0.2 mg, 0.2 mg, Intravenous, Q2H PRN **OR** HYDROmorphone HCl (DILAUDID) 1 MG/ML injection 0.4 mg, 0.4 mg, Intravenous, Q2H PRN **OR** HYDROmor 05/28/2021    ALKPHO 137 05/28/2021     05/28/2021     05/28/2021    BILT 0.4 05/28/2021    ALB 2.5 05/28/2021    TP 5.7 05/28/2021     Lab Results   Component Value Date    INR 1.54 (H) 05/28/2021    INR 1.88 (H) 05/27/2021    INR 2.77 (H) 0

## 2021-05-28 NOTE — PROGRESS NOTES
[facility]    Nephrology Progress Note    Areta Schlatter Attending:  Quita Tang DO     Cc: JASON    SUBJECTIVE     Breathing improved this am   Sp cath this am   No other complaints     PHYSICAL EXAM   Vital signs: /69   Pulse 92   Temp PRN  ipratropium-albuterol (DUONEB) nebulizer solution 3 mL, 3 mL, Nebulization, Q4H PRN  HYDROmorphone HCl (DILAUDID) 1 MG/ML injection 0.2 mg, 0.2 mg, Intravenous, Q2H PRN   Or  HYDROmorphone HCl (DILAUDID) 1 MG/ML injection 0.4 mg, 0.4 mg, Intravenous, in the right costophrenic angle. The lung volumes are     large Linear areas of airspace     disease at the right lung base could reflect atelectasis or scarring and     is mildly improved compared to previous. There is no new infiltrate seen.       Shruthi and external     carotid artery. The right common carotid artery was noted to be occluded     on the CTA. The carotid bulb and internal carotid artery reconstitutes as     was seen on the previous CTA.   This is not seen well on the ultrasound     exam be the mid to distal left     common carotid artery by Doppler spectral analysis. 2. There is less than 50% stenosis at the left carotid bulb.     3. 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                                    =====    CONCLUSION:           1. No acute intracranial abnormality identified. 2. Minimal chronic microvascular ischemic changes in the cerebral white     matter.  Old lacunar infarcts in the thalami and There has been interval placement of a right IJ central venous     catheter with the tip seen at the level of the mid SVC, appearing     adequately positioned. Stable borderline cardiomegaly. Lungs are     essentially clear.   Pulmonary vasculature is normal.  Slight blunting of the right costophrenic     sulcus. Mild venous engorgement. Limited due to lordotic positioning.       No definite evidence of acute airspace consolidation.                               =====    CONCLUSION:  Small right pleura AORTA/VASCULAR:  There is an infrarenal abdominal aortic aneurysm which     measures maximum axial dimensions of 5 x 4.2 cm (previously 4.7 x 4.4 cm). There is extensive irregular ulcerated plaque throughout the abdominal     aorta.     RETROPERITO vertebral arteries. All measurements obtained in this exam were performed using NASCET. Dose     reduction techniques were used.  Dose information is transmitted to the ACR     ([address] [place] [address] of Radiology) [address] ([address]) without     obstructing lesions.   PCA branches are normal.  There are bilateral     posterior communicating arteries.                          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 There is a background of mild     chronic small vessel ischemic disease. There is no midline shift or mass-effect. The basal cisterns are patent. The gray-white matter differentiation is intact.          There is no acute intracra lactic acidosis. Lactic > 14 on admit. Improved w IVFs  Anemia/thrombocytopenia -- sp PRBCs, sp EGD/colonoscopy. GI on consult   Hypotension -- sp IVFs. Pressors if needed.  CXR noted  BRIAN / Renal infarct / AAA -- was seen by vascular and was on apixaban  N

## 2021-05-28 NOTE — PROGRESS NOTES
[facility]  Progress Note    Jorene Rubinstein Patient Status:  Inpatient    1945 MRN [id number]   [facility] 6NE-A Attending Stephen Mariscal DO   Hosp Day # 3 PCP Frankie Chamorro DO     ADMISSION DATE AND TIME: 2021 11:19 18 mg/dL    Creatinine 1.58 (H) 0.55 - 1.02 mg/dL    BUN/CREA Ratio 29.7 (H) 10.0 - 20.0    Calcium, Total 8.4 (L) 8.5 - 10.1 mg/dL    Calculated Osmolality 301 (H) 275 - 295 mOsm/kg    GFR, Non- 31 (L) >=60    GFR, -American 36 (L) Lymphocyte % Manual 23 %    Monocyte % Manual 8 %    Eosinophil % Manual 0 %    Basophil % Manual 0 %    Metamyelocyte % 1 %    NRBC 8 (H) 0    Total Cells Counted 100     RBC Morphology See morphology below (A) Normal, Slide reviewed, see previous RBC mor right basilar atelectasis or scarring. 3. Small amount of fluid seen in the right costophrenic angle and minor fissure. 4. Cardiomegaly without jaspreet edema.      Dictated by (CST): Good Cedillo MD on 5/28/2021 at 5:40 AM     Finalized by (CST): Abby Sidhu

## 2021-05-28 NOTE — PLAN OF CARE
JASON improving. (SCr down to 1.5 mg/dL; estimated CrCl 24.8 mL/min.) Zosyn adjusted to 3.375 grams IV every 8 hours per protocol.     Miki Robbins PharmD  05/28/21 1:07 PM

## 2021-05-28 NOTE — PROGRESS NOTES
[facility] Cardiology  Progress Note    Monique Stevenson Patient Status:  Inpatient    1945 MRN [id number]   [facility] 6NE-A Attending Shima Yanez MD   [address] Day # 3 PCP Faisal Cornejo DO     Impression:  1.  NSTE outweighing potential benefit at this point. Had a long discussion with daughter, Diego Valverde; concluded that conservative medical management is proper course of action at this juncture.   If she demonstrates sustained recovery and improvement from life-thre 650 mg, Oral, Q4H PRN   Or  acetaminophen (TYLENOL) 650 MG rectal suppository 650 mg, 650 mg, Rectal, Q4H PRN  Labetalol HCl (TRANDATE) injection 10 mg, 10 mg, Intravenous, Q10 Min PRN  hydrALAzine HCl (APRESOLINE) injection 10 mg, 10 mg, Intravenous, Q2H

## 2021-05-28 NOTE — PROGRESS NOTES
[facility]  Progress Note    Fei Oropeza Patient Status:  Inpatient    1945 MRN [id number]   [facility] 6NE-A Attending Val Valdez MD   [facility] Day # 3 PCP Colten Roberts DO     Subjective:  Fei Oropeza is a( BMI 32.26 kg/m²   General appearance: alert, appears stated age and cooperative  Lungs: clear to auscultation bilaterally  Heart: reg rate  Abdomen: soft, non-tender; bowel sounds normal; no masses,  no organomegaly   Extremities: extremities normal, atr not noted on these exams, a bleeding source deeper than what could be evaluated on these exams, a bleeding source that may become present if on anticoagulation or an intermittent source of bleeding.   Non intestinal bleeding or non GI causes of the low bloo bleeding  --agree w/ monitoring labs  --would cont ppi daily as she is on outpt asa        Will sign off for now, please call w/ questions     --patient demonstrated understanding of the results and recommendations and risks, benefits, limitations, and alt

## 2021-05-28 NOTE — CONSULTS
Pt known to the practice current Dr Jagjit Durham patient with known spinal stenosis and non-small cell cancer readmitted for pain and a fracture. She was evaluated in the ER and admitted.   Please see my note

## 2021-05-28 NOTE — PROGRESS NOTES
Gi addendum    -hpylori is (-) on gastric bx.     Barney Garcia MD  [address] [place] [address] Gastroenterology

## 2021-05-29 NOTE — PROGRESS NOTES
[facility]    Nephrology Progress Note    Keenan Pereira Attending:  Mohsen Juáerz DO     Cc: JASON    SUBJECTIVE     Breathing improved this am   No other complaints     PHYSICAL EXAM   Vital signs: /81 (BP Location: Left arm)   Pulse 96 nebulizer solution 3 mL, 3 mL, Nebulization, Q4H PRN  HYDROmorphone HCl (DILAUDID) 1 MG/ML injection 0.2 mg, 0.2 mg, Intravenous, Q2H PRN   Or  HYDROmorphone HCl (DILAUDID) 1 MG/ML injection 0.4 mg, 0.4 mg, Intravenous, Q2H PRN   Or  HYDROmorphone HCl (DIL previous. There is no new infiltrate seen. Stable prominent interstitial markings are noted bilaterally.                               =====    CONCLUSION:      1. Large lung volumes could reflect underlying obstructive pulmonary     disease. 2.  Ken Adan CTA.  This is not seen well on the ultrasound     exam because of the limitations as     described above.          There is calcified irregular plaque identified involving the left common     carotid artery, carotid bulb and proximal left internal carotid a artery as seen on     previous CTA, however technical factors related to bandages limited     sonographic evaluation of the right carotid artery. 4. Antegrade flow within both vertebral arteries.                           Dictated by (CST): Joe Aschoff matter. Old lacunar infarcts in the thalami and basal ganglia.                Dictated by (CST): Boogie Gautam MD on 5/26/2021 at 2:33 PM         Finalized by (CST): Boogie Gautam MD on 5/26/2021 at 2:35 PM        XR CHEST AP PORTABLE  (CPT=71045) essentially clear. Pulmonary vasculature is     within normal limits                               =====    CONCLUSION:      1. Interval placement of right IJ central venous catheter which appears     adequately positioned.     2. Stable borderline car =====    CONCLUSION:  Small right pleural fluid and mild pulmonary venous     engorgement suggested. No evidence of cardiac enlargement or airspace     consolidation.                    Dictated by (CST): Jass Hanley MD on 5/25/2021 at 12: plaque throughout the abdominal     aorta. RETROPERITONEUM:  No mass or adenopathy. BOWEL/MESENTERY:  No visible mass, obstruction, or bowel wall thickening. ABDOMINAL WALL:  No mass or hernia.       URINARY BLADDER:  No visible focal wall Radiology) NRDR ([address])     which includes the Dose Index     Registry. PATIENT STATED HISTORY:(As transcribed by Technologist)  Patient passout     twice today.  Right side weakness          CONTRAST USED:  75cc of Omnipa carotid artery from the origin     to the carotid bifurcation with reconstitution of the right internal     carotid artery above the bifurcation with runoff in the neck to the     cavernous segment on the right.          Multi segmental disease of the left differentiation is intact. There is no acute intracranial hemorrhage or extra-axial fluid collection. There is no evident fracture. The visualized paranasal sinuses and     mastoid air cells are unremarkable. infarct / AAA -- was seen by vascular and was on apixaban  NSTEMI --  Echocardiogram with EF: 20% G1DD, PASP: 40-45mmHg, +RWMA. Sp cath on 5/28/21. Not candidate for CABG     D/w RN    Thank you for allowing me to participate in the care of this patient.  P

## 2021-05-29 NOTE — PLAN OF CARE
Assumed care of patient at 0730. Patient alert X 4. NSR/ST on monitor. Lungs diminished and with occasional expiratory wheezing requiring PRN nebs. Dyspnea with exertion. SBP normotensive. Poor appetite. PRN dilaudid for back pain.  Karan LAURA'd per renal. Up Problem: CARDIOVASCULAR - ADULT  Goal: Maintains optimal cardiac output and hemodynamic stability  Description: INTERVENTIONS:  - Monitor vital signs, rhythm, and trends  - Monitor for bleeding, hypotension and signs of decreased cardiac output  - Evalua

## 2021-05-29 NOTE — HOME CARE LIAISON
[facility] rec'd referreal from Jude Connor CM to offer [facility] and [facility] on dc.   TNL met with ptnt at bedside and ptnt declined HH stated her and  and dtr (who's an RN) will be able to manage her care on dc    Thanks  Cassidy Linda

## 2021-05-29 NOTE — PROGRESS NOTES
[facility] PULMONARY/CRITICAL CARE    S: No new events overnight.     Meds:  • metoprolol succinate  12.5 mg Oral Daily Beta Blocker   • piperacillin-tazobactam  3.375 g Intravenous Q8H   • Rosuvastatin Calcium  5 mg Oral Nightly   • Pantoprazole Sodium  40 mg Ora 17.8* 17.2*  --    NEPRELIM 5.27  --  5.50  --   --  3.65  --   --    WBC 7.7   < > 7.9  --   --  6.0 5.8  --    .0*   < > 232.0   < >  --  321.0 333.0 426.0    < > = values in this interval not displayed.      Recent Labs   Lab 05/27/21  0434 05/27/ of obvious source of infection.   - troponin continues to increase significantly c/w NSTEMI - Echocardiogram with EF: 20% G1DD, PASP: 40-45mmHg, +RWMA  - no obvious abdominal process at this point  - concern for evolving renal infarct vs pyelonephritis on i

## 2021-05-29 NOTE — PROGRESS NOTES
[facility] Hospitalist Progress Note   CC: follow-up hospital admission - syncope     SUBJECTIVE:  Interval History:   - pt laying in bed, on 2L. Feeling a little better. Some improvement cp/sob. Back pain same.      OBJECTIVE:  Scheduled Meds:   • metoprolol succ 2.64*  --  3.06* 3.05*  --    HGB 6.7*   < > 8.1* 7.6* 9.0* 9.0* 8.6*  --    HCT 20.8*  --  25.3* 24.0*  --  27.5* 27.3*  --    .0*  --  126.0* 232.0  --  321.0 333.0 426.0    < > = values in this interval not displayed.        Chem:   Recent Labs and GI were consulted. Cardiologist impression was diffuse ischemic changes due to anemia, will need GI issues addressed, then ischemic work up.  GI evaluated patient concerned for UGI source, so started on ppi drip and rec endoscopies after eliquis washout improving w/ correction of acidosis   - mgmt per pulmonary      Acute on chronic anemia   - gi consulted, eliquis on hold  - s/p egd / colonoscopy 5/27 - d/w Dr. Maegan Bradley - distal duodenal AVM, no active bleeding, gastric erosion/gastritis - no active bleedi

## 2021-05-29 NOTE — PLAN OF CARE
Assumed care of pt around 1930. A+O x4. Neuro intact with baseline R sided numbness. NSR-ST with multifocal PVCs. 's-130's. Heparin gtt infusing per ACS protocol. Breath sounds diminished with expiratory wheezes. Karan.  Pt resting in bed, call light

## 2021-05-29 NOTE — PROGRESS NOTES
[facility]  Progress Note    Brissa Serabad Patient Status:  Inpatient    1945 MRN [id number]   [facility] 6NE-A Attending Michael Deras DO   Hosp Day # 4 PCP Venu Hammond DO     ADMISSION DATE AND TIME: 2021 11:19 (H) 11.0 - 15.0 %    RDW-SD 53.1 (H) 35.1 - 46.3 fL   PTT, ACTIVATED    Collection Time: 05/28/21  8:26 AM   Result Value Ref Range    PTT 54.9 (H) 25.4 - 36.1 seconds   POCT GLUCOSE    Collection Time: 05/28/21 12:07 PM   Result Value Ref Range    POC Glu 4:55 AM   Result Value Ref Range    Phosphorus 2.7 2.5 - 4.9 mg/dL         IMAGING:    No results found. MEDICATIONS:  Medications reviewed.     • metoprolol succinate  12.5 mg Oral Daily Beta Blocker   • piperacillin-tazobactam  3.375 g Intravenous Q8

## 2021-05-29 NOTE — PROGRESS NOTES
[facility] Resource Referral Counselor Note    Jemima Heller Patient Status:  Inpatient    1945 MRN [id number]   [facility] 6NE-A Attending Mere Bland MD   [address] Day # 4 PCP DO ANGE Damian(subjective

## 2021-05-29 NOTE — SLP NOTE
ADULT SWALLOWING EVALUATION    ASSESSMENT    ASSESSMENT/OVERALL IMPRESSION:  Patient is a 66year-old female who was admitted on 5/25/2021 with Syncope and collapse [R55], Lactic acidosis [E87.2], Slurred speech [R47.81], Non-STEMI (non-ST elevated myocard adhesive. Inpatient speech therapy services are not warranted at this time; discharge from services. EDUCATION  Patient verbalized understanding to results and recommendations of this evaluation and was in agreement. Spoke with SAMMI Cox.             JOSETTE (CST): Ranjan Thompson MD on 5/26/2021 at 2:33 PM           OBJECTIVE   ORAL MOTOR EXAMINATION  Dentition: Upper dentures (loose fit; does not own bottom dentures)  Symmetry: Within Functional Limits  Strength:  Within Functional Limits     Range of Motion:

## 2021-05-29 NOTE — PROGRESS NOTES
[facility] [address] [place] [address] Cardiology  Progress Note    Freya Ritchie Patient Status:  Inpatient    1945 MRN [id number]   [facility] 6NE-A Attending Syed Lorenzana MD   [address] Day # 4 PCP Dwain Dickinson DO           Subjective: Or  acetaminophen (TYLENOL) 650 MG rectal suppository 650 mg, 650 mg, Rectal, Q4H PRN  Labetalol HCl (TRANDATE) injection 10 mg, 10 mg, Intravenous, Q10 Min PRN  hydrALAzine HCl (APRESOLINE) injection 10 mg, 10 mg, Intravenous, Q2H PRN  ondansetron HCl (ZO Will start heparin/no bolus and closely observe for recurrent GIB. 2. discuss coronary anatomy with Dr. Maulik Pelaez. High risk for surgery/may be prohibitive based on comorbities. Similarly high risk for multi-vessel PCI +/- impella support.   Continue medical

## 2021-05-30 NOTE — PROGRESS NOTES
[facility] Cardiology Progress Note        Freya Ritchie Patient Status:  Inpatient    1945 MRN [id number]   [facility] 8NE-A Attending Sofia Kim MD   [facility] Day # 5 PCP DO Nancy Dutton S1S2.  No S3, S4, rub, click. No murmur. Lungs: Clear to auscultation and percussion. Abdomen: Soft, non-tender. Extremities: No LE edema. No clubbing or cyanosis. Neurologic: Alert and oriented, normal affect. Skin: Warm and dry.            LABS: 89.900 () 05/26/2021    TROP 22.100 () 05/25/2021         Invalid input(s): PBNPML                       Diagnostics:   Telemetry: SR    EKG, 5/30/2021,       Impression:  1.  NSTEMI (Trop peak at 108)  - [facility] (5/28/21): LM-ok, LAD- prox 90% hazy thrombo

## 2021-05-30 NOTE — PLAN OF CARE
Assumed care of pt around 1930. A+Ox4. Neuro intact with baseline R sided numbness. NSR-ST with multifocal PVCs. 's-130's. SOB at times with audible exp wheezing- nebs, see MAR. PRN analgesics for chronic back pain, see MAR.    Russo removed in

## 2021-05-30 NOTE — PROGRESS NOTES
[facility]    Nephrology Progress Note    Karina Zarate Attending:  Olegario Leon DO     Cc: JASON    SUBJECTIVE     Some increased SOB earlier but now resolved  No other complaints     PHYSICAL EXAM   Vital signs: /69 (BP Location: Left Intravenous, Q10 Min PRN  hydrALAzine HCl (APRESOLINE) injection 10 mg, 10 mg, Intravenous, Q2H PRN  ondansetron HCl (ZOFRAN) injection 4 mg, 4 mg, Intravenous, Q6H PRN   Or  Metoclopramide HCl (REGLAN) injection 5 mg, 5 mg, Intravenous, Q8H PRN  HYDROmorp The lung volumes are     large Linear areas of airspace     disease at the right lung base could reflect atelectasis or scarring and     is mildly improved compared to previous. There is no new infiltrate seen.       Stable prominent interstitial markings The right common carotid artery was noted to be occluded     on the CTA. The carotid bulb and internal carotid artery reconstitutes as     was seen on the previous CTA.   This is not seen well on the ultrasound     exam because of the limitations as     de carotid artery by Doppler spectral analysis. 2. There is less than 50% stenosis at the left carotid bulb.     3. 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 No acute intracranial abnormality identified. 2. Minimal chronic microvascular ischemic changes in the cerebral white     matter. Old lacunar infarcts in the thalami and basal ganglia.                Dictat of a right IJ central venous     catheter with the tip seen at the level of the mid SVC, appearing     adequately positioned. Stable borderline cardiomegaly. Lungs are     essentially clear.   Pulmonary vasculature is     within normal limits right costophrenic     sulcus. Mild venous engorgement. Limited due to lordotic positioning.       No definite evidence of acute airspace consolidation.                               =====    CONCLUSION:  Small right pleural fluid and mild pulmonary venou infrarenal abdominal aortic aneurysm which     measures maximum axial dimensions of 5 x 4.2 cm (previously 4.7 x 4.4 cm). There is extensive irregular ulcerated plaque throughout the abdominal     aorta. RETROPERITONEUM:  No mass or adenopathy. obtained in this exam were performed using NASCET. Dose     reduction techniques were used.  Dose information is transmitted to the ACR     ([facility]) Prashanth Magaña 35 ([address])     which includes the Dose Index     Registr branches are normal.  There are bilateral     posterior communicating arteries.                          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 There is no midline shift or mass-effect. The basal cisterns are patent. The gray-white matter differentiation is intact.          There is no acute intracranial hemorrhage or extra-axial fluid collec normotension to increase renal perfusion   -- restarting lisinopril today per cards, if Cr bumps will need to hold. Baseline Cr likely 1.1s  -- avoid nsaids and other nephrotoxins       Acidosis -- due to lactic acidosis. Lactic > 14 on admit.  Improved w I

## 2021-05-30 NOTE — PROGRESS NOTES
[facility] Hospitalist Progress Note   CC: follow-up hospital admission - syncope     SUBJECTIVE:  Interval History:   - pt sitting in bed, on RA. Was SOB and anxious earlier, some labored breathing c exp wheezes, better now. More SOB c walking. LBP same.     OBJE 05/26/21  0431 05/26/21  0431 05/27/21  0435 05/27/21  1310 05/28/21  0357 05/28/21  0826 05/29/21  0455 05/30/21  0444   WBC 7.1  --  7.7  --  7.9  --  6.0 5.8  --   --    RBC 2.27*  --  2.83*  --  2.64*  --  3.06* 3.05*  --   --    HGB 6.7*   < > 8.1*  - initially. CT head w/o acute bleed. Labs sig for JASON, acute on chronic anemia, mild stable thrombocytopenia. Elevated troponin w/ diffuse ST depressions/ NSTEMI.  She was given iv fluids and started on a unit of pRBCs, IV protonix, cardiology and GI were co care    NSTEMI  Acute systolic CHF   - cards consulted, s/p [facility] 5/28, complex CAD  - medical management for now   - holding blood thinners to avoid GI bleed, starting asa, monitor   - BB, ace started     Acute respiratory failure  - due to metabolic acidos

## 2021-05-30 NOTE — PROGRESS NOTES
[facility] PULMONARY/CRITICAL CARE    S: Called to reeval for acute onset of increased SOB this AM on top of her chronic SOB. By the time I came to the bedside the patient was breathing no diff then yesterday.     Meds:  • ipratropium-albuterol  3 mL Nebulization 8.6*  --   --    HCT 25.3*   < > 24.0*  --   --  27.5*  --  27.3*  --   --    MCV 89.4   < > 90.9  --   --  89.9  --  89.5  --   --    MCH 28.6   < > 28.8  --   --  29.4  --  28.2  --   --    MCHC 32.0   < > 31.7  --   --  32.7  --  31.5  --   --    RDW 16 05/26/21  0212 05/26/21  0816   TROP 22.100* 89.900* 108.000*       Imaging -- reviewed and visualized  Assessment and Plan    1.  Lactic acidosis: 2/2 global hypoperfusion from acute blood loss anemia and possible cardiogenic component leading to end organ

## 2021-05-30 NOTE — PROGRESS NOTES
NURSING TRANSFER NOTE      Patient admitted via Bed  Oriented to room. Safety precautions initiated. Bed in low position. Call light in reach. Pt transferred from CNICU with sepsis diagnosis .  Transfer head to toe assessment completed with Dusty

## 2021-05-31 NOTE — PROGRESS NOTES
[facility] Cardiology Progress Note        Gin Powell Patient Status:  Inpatient    1945 MRN [id number]   [facility] 8NE-A Attending Birdie Grove MD   [facility] Day # 6 DO Maximino Melvin JVD, carotids 2+ no bruits. Cardiac: Regular S1S2. No S3, S4, rub, click. No murmur. Lungs: Clear to auscultation and percussion. Abdomen: Soft, non-tender. Extremities: No LE edema. No clubbing or cyanosis.     Neurologic: Alert and oriented, albina Value Date    TROP 108.000 () 05/26/2021    TROP 89.900 () 05/26/2021    TROP 22.100 ([facility]) 05/25/2021         Invalid input(s): MIRNA                       Diagnostics:   Telemetry: SR    EKG, 5/30/2021,       Impression:  1.  NSTEMI (Trop peak at 108)

## 2021-05-31 NOTE — PLAN OF CARE
Alert and oriented x4 but anxious on tele monitor hr 100's sinus tach. Still sob with activity. O2 at 1l nc in used. Denies any pain. Oob to bsc x2 and tolerated well. Updated w/ poc and verbalized understanding.  All needs attended and will continue to mon CARDIOVASCULAR - ADULT  Goal: Maintains optimal cardiac output and hemodynamic stability  Description: INTERVENTIONS:  - Monitor vital signs, rhythm, and trends  - Monitor for bleeding, hypotension and signs of decreased cardiac output  - Evaluate effectiv

## 2021-05-31 NOTE — PROGRESS NOTES
[facility] Hospitalist Progress Note   CC: follow-up hospital admission - syncope     SUBJECTIVE:  Interval History:   - pt napping in bed, some LBP. Got breathing treatment earlier. 129/73, 90.  On 1-2L    OBJECTIVE:  Scheduled Meds:   • [START ON 6/1/2021] metop 05/28/21  0357 05/28/21  0826 05/29/21  0455 05/30/21  0444 05/31/21  0631   WBC 7.7  --  7.9  --   --  6.0 5.8  --   --  5.1   RBC 2.83*  --  2.64*  --   --  3.06* 3.05*  --   --  3.04*   HGB 8.1*   < > 7.6*  --  9.0* 9.0* 8.6*  --   --  8.5*   HCT 25.3* sig for JASON, acute on chronic anemia, mild stable thrombocytopenia. Elevated troponin w/ diffuse ST depressions/ NSTEMI. She was given iv fluids and started on a unit of pRBCs, IV protonix, cardiology and GI were consulted.  Cardiologist impression was diff consulted, s/p [facility] 5/28, complex CAD  - medical management for now   - holding blood thinners to avoid GI bleed, starting asa, monitor   - increasing BB, ace started     Acute respiratory failure  - due to metabolic acidosis and failure to compensate   - i

## 2021-05-31 NOTE — PROGRESS NOTES
Pulmonary Progress Note        NAME: Roselyn Rendon - ROOM: 6250/[id number]-N - MRN: [id number] - Age: 66year old - : 1945        Last 24hrs: No events overnight, sitting up in a chair, notes that she had some chest tightness earlier but denies issue NSTEMI  - Cont zosyn  - Cultures pending, NGTD  2. SOB   - chronic SOB appear to be due to underlying metabolic acidosis and compensation. - will monitor  - wean O2 as able, 2L currently   3.  Anemia  - Blood loss a possibility, though per GI eval [facility]

## 2021-05-31 NOTE — PROGRESS NOTES
[facility]    Nephrology Progress Note    Ney Hummel Attending:  Tania Moreno DO     Cc: JASON    SUBJECTIVE     No complaints today    PHYSICAL EXAM   Vital signs: /69 (BP Location: Right arm)   Pulse 101   Temp 97.8 °F (36.6 °C) (Or PRN  hydrALAzine HCl (APRESOLINE) injection 10 mg, 10 mg, Intravenous, Q2H PRN  ondansetron HCl (ZOFRAN) injection 4 mg, 4 mg, Intravenous, Q6H PRN   Or  Metoclopramide HCl (REGLAN) injection 5 mg, 5 mg, Intravenous, Q8H PRN  HYDROmorphone HCl (DILAUDID) 1 pleural fluid seen in the right costophrenic angle. The lung volumes are     large Linear areas of airspace     disease at the right lung base could reflect atelectasis or scarring and     is mildly improved compared to previous.   There is no new infiltra internal carotid artery and external     carotid artery. The right common carotid artery was noted to be occluded     on the CTA. The carotid bulb and internal carotid artery reconstitutes as     was seen on the previous CTA.   This is not seen well on th stenosis seen within the mid to distal left     common carotid artery by Doppler spectral analysis. 2. There is less than 50% stenosis at the left carotid bulb.     3. 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                                    =====    CONCLUSION:           1. No acute intracranial abnormality identified. 2. Minimal chronic microvascular ischemic changes in the cerebral white     matter.  Old lacunar infarcts FINDINGS:  There has been interval placement of a right IJ central venous     catheter with the tip seen at the level of the mid SVC, appearing     adequately positioned. Stable borderline cardiomegaly. Lungs are     essentially clear.   Pulmonar FINDINGS:  Cardiac size normal.  Slight blunting of the right costophrenic     sulcus. Mild venous engorgement. Limited due to lordotic positioning.       No definite evidence of acute airspace consolidation.                               =====    CONCLUS mass or enlargement. AORTA/VASCULAR:  There is an infrarenal abdominal aortic aneurysm which     measures maximum axial dimensions of 5 x 4.2 cm (previously 4.7 x 4.4 cm).       There is extensive irregular ulcerated plaque throughout the abdominal the carotid and vertebral arteries. All measurements obtained in this exam were performed using NASCET. Dose     reduction techniques were used.  Dose information is transmitted to the ACR     ([address] [place] [address] of Radiology) Prashanth Magaña [address] [place] [address] Radiology Landon junction are patent without     obstructing lesions.   PCA branches are normal.  There are bilateral     posterior communicating arteries.                          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 There is a background of mild     chronic small vessel ischemic disease. There is no midline shift or mass-effect. The basal cisterns are patent. The gray-white matter differentiation is intact.          There is perfusion   -- avoid nsaids and other nephrotoxins       Acidosis -- due to lactic acidosis. Lactic > 14 on admit. Improved w IVFs  Anemia/thrombocytopenia -- sp PRBCs, sp EGD/colonoscopy. GI on consult   Hypotension -- sp IVFs. Pressors if needed.  CXR not

## 2021-05-31 NOTE — PLAN OF CARE
Received pt this morning in bed, A&O X3, anxious with shallow breathing, SR/ST per tele, mild lower ext edema, up in chair with 1-2 person assist, c/o`s R hip pain prn meds given with good relief.    Problem: NEUROLOGICAL - ADULT  Goal: Achieves stable or i stability  Description: INTERVENTIONS:  - Monitor vital signs, rhythm, and trends  - Monitor for bleeding, hypotension and signs of decreased cardiac output  - Evaluate effectiveness of vasoactive medications to optimize hemodynamic stability  - Monitor ar

## 2021-06-01 NOTE — PROGRESS NOTES
[facility] Hospitalist Progress Note   CC: follow-up hospital admission - syncope     SUBJECTIVE:  Interval History:   -pt laying in bed, being cleaned by staff.  Feels ok    OBJECTIVE:  Scheduled Meds:   • metoprolol succinate  50 mg Oral Daily Beta Blocker   • a 05/31/21  0631   WBC 7.7  --  7.9  --   --  6.0 5.8  --   --  5.1   RBC 2.83*  --  2.64*  --   --  3.06* 3.05*  --   --  3.04*   HGB 8.1*   < > 7.6*  --  9.0* 9.0* 8.6*  --   --  8.5*   HCT 25.3*  --  24.0*  --   --  27.5* 27.3*  --   --  27.9*   . 0 thrombocytopenia. Elevated troponin w/ diffuse ST depressions/ NSTEMI. She was given iv fluids and started on a unit of pRBCs, IV protonix, cardiology and GI were consulted.  Cardiologist impression was diffuse ischemic changes due to anemia, will need GI i complex CAD  - medical management for now   - held blood thinners to avoid GI bleed, now started asa, monitor   - increased BB, ace started, statin     Acute respiratory failure  - due to metabolic acidosis and failure to compensate   - improving w/ correc them    Briseyda Gunter MD  [facility] Hospitalist  [phone]  6/1/2021  11:55 AM

## 2021-06-01 NOTE — OCCUPATIONAL THERAPY NOTE
Attempted to see patient for OT services this am, however patient politely declining activity at this time, requesting therapist return after lunch. Will reattempt as able.

## 2021-06-01 NOTE — PLAN OF CARE
AO x4. Cooperative, anxious- reassurance as needed, ativan given. Up x1-2 walker and gaitbelt. NSR/Sinus tach on cardiac monitor. Adequate saturation on room air with 2L while sleeping. Lung sounds diminished with expiratory wheezing, prn nebs available.  D guidance from PT/OT  - Encourage visually impaired, hearing impaired and aphasic patients to use assistive/communication devices  Outcome: Progressing     Problem: CARDIOVASCULAR - ADULT  Goal: Maintains optimal cardiac output and hemodynamic stability  David Cunha

## 2021-06-01 NOTE — PLAN OF CARE
Received patient at [phone] [id number]. Patient A/O x 4. Tele Rhythm NSR. O2 Saturation 95%. On 2L NC for comfort. Breath sounds diminished, no wheezing noted. No C/O chest pain or SOB. Patient voiding with no issue. LBM this evening. Zosyn Q12.  Bed is locked an

## 2021-06-01 NOTE — PHYSICAL THERAPY NOTE
Attempted to see pt this AM.politely refusing and is currently on nursing care. Will f/u in PM as schedule allows otherwise will be seen tomorrow.

## 2021-06-01 NOTE — PROGRESS NOTES
[facility]    Nephrology Progress Note    Fei Oropeza Attending:   Val Valdez MD       SUBJECTIVE:     Feels weak  Endorses double voiding but otherwise no urinary complaints  No leg swelling    PHYSICAL EXAM:     Vital Signs: /6 05/28/2021    NEUTABS 4.08 05/28/2021    LYMPHABS 1.38 05/28/2021    EOSABS 0.00 05/28/2021    BASABS 0.00 05/28/2021    NEUT 68 05/28/2021    LYMPH 23 05/28/2021    MON 8 05/28/2021    EOS 0 05/28/2021    BASO 0 05/28/2021    NEPERCENT 69.6 05/27/2021 AC  acetaminophen (TYLENOL) tab 650 mg, 650 mg, Oral, Q4H PRN   Or  acetaminophen (TYLENOL) 650 MG rectal suppository 650 mg, 650 mg, Rectal, Q4H PRN  Labetalol HCl (TRANDATE) injection 10 mg, 10 mg, Intravenous, Q10 Min PRN  hydrALAzine HCl (APRESOLINE) i MD Dougherty [address] [place] [address] Nephrology  [address]  [address] [place] [address]  [place], [address]    6/1/2021  1:33 PM

## 2021-06-01 NOTE — PROGRESS NOTES
R AC IV extravasated with zosyn from previous dose. IV removed. Catheter intact. This RN called pharmacy to check protocol for treatment. Per policy, heat applied and site monitored. Radial pulse present 2+, capillary refill less than 3 seconds.  MD notifie

## 2021-06-01 NOTE — PLAN OF CARE
Assumed care of pt at 0700. Pt A&Ox4. Can be anxious. NSR on tele. Lung sounds diminished with wheezes on auscultation on 1L O2 NC for comfort. Saturations in high 90s. Pt receiving scheduled nebs. . Ambulating in room with 2 assist, GB, and walker.  Pt assistive/communication devices  Outcome: Progressing     Problem: CARDIOVASCULAR - ADULT  Goal: Maintains optimal cardiac output and hemodynamic stability  Description: INTERVENTIONS:  - Monitor vital signs, rhythm, and trends  - Monitor for bleeding, hyp specific interventions  Outcome: Progressing

## 2021-06-01 NOTE — PROGRESS NOTES
[facility] [address] [place] [address] Cardiology Progress Note        Annamarie Billings Patient Status:  Inpatient    1945 MRN [id number]   [facility] 8NE-A Attending Cassia Piña MD   [facility] Day # 7 PCP Jyl Jeans, DO Cordelia Mike deficits. Neck: No JVD, carotids 2+ no bruits. Cardiac: Regular S1S2. No S3, S4, rub, click. No murmur. Lungs: Clear to auscultation and percussion. Abdomen: Soft, non-tender. Extremities: No LE edema. No clubbing or cyanosis.     Neurologic: Alert Results   Component Value Date    TROP 108.000 () 05/26/2021    TROP 89.900 () 05/26/2021    TROP 22.100 () 05/25/2021         Invalid input(s): PBNPML                       Diagnostics:   Telemetry: SR    EKG, 5/30/2021,       Impression:  1.  NSTEMI

## 2021-06-01 NOTE — PROGRESS NOTES
[address] Patient Status:  Inpatient    1945 MRN [id number]   [facility] 8NE-A Attending Fouzia Boles MD   [facility] Day # 7 PCP Rafi Chavez DO     Pulm / Critical Care Progress Note     S: pt states 05/28/21  0826 05/29/21  0455 05/30/21  0444 05/31/21  0631   WBC 6.0  --  5.8  --   --   --  5.1   HGB 9.0*  --  8.6*  --   --   --  8.5*   HCT 27.5*  --  27.3*  --   --   --  27.9*   .0   < > 333.0   < > 426.0 473.0* 517.0*  517.0*    < > = values

## 2021-06-02 NOTE — PLAN OF CARE
Received patient at 1. Patient A/O x 4. Tele Rhythm NSR. O2 Saturation 98%. On 2L NC. No C/O chest pain or SOB. Patient voiding with no issue. Had BM this evening. Zosyn Q12. Bed is locked and in low position.    Call light and personal items with

## 2021-06-02 NOTE — OCCUPATIONAL THERAPY NOTE
OCCUPATIONAL THERAPY TREATMENT NOTE - INPATIENT     Room Number: 5837/5872-M  Session: 1   Number of Visits to Meet Established Goals: 5    Presenting Problem: syncope, GI hemorrhage, NSTEMI    History related to current admission: copied from BERNARD burt 5/28 Procedure: COLONOSCOPY;  Surgeon: David Arvizu MD;  Location: [facility] ENDOSCOPY   • KNEE SURGERY Left     knee scope cleaned up   • OTHER SURGICAL HISTORY Bilateral     bilateral wrist cyst removed   • REMOVAL OF LUNG,LOBECTOMY         SUBJECTIVE  \"I and reaching to adjust sheets on chair      ADL  Toileting: SUPV at seated with no AD for norah-care    Pt left seated in chair with all needs met. Pt educated on self-pacing, using RW during functional mobility. Pt verbalized understanding.   Patient End o

## 2021-06-02 NOTE — PROGRESS NOTES
[facility] Hospitalist Progress Note   CC: follow-up hospital admission - syncope     SUBJECTIVE:  Interval History:   No acute events.         OBJECTIVE:  Scheduled Meds:   • Albumin Human  250 mL Intravenous Once   • metoprolol succinate  50 mg Oral Daily Beta B WBC 7.9  --   --  6.0  --  5.8  --   --  5.1 8.5   RBC 2.64*  --   --  3.06*  --  3.05*  --   --  3.04* 3.13*   HGB 7.6*   < > 9.0* 9.0*  --  8.6*  --   --  8.5* 8.9*   HCT 24.0*  --   --  27.5*  --  27.3*  --   --  27.9* 29.4*   .0   < >  --  321 concerned for UGI source, so started on ppi drip and rec endoscopies after eliquis washout. Patient became progressively dyspneic.  ABG obtained demonstrating severe metabolic acidosis w/ a lactic acid of 14.     Syncope   SIRS possible Sepsis w/ multiple o anxiety contributing to some sxs, prn ativan      Acute on chronic anemia   - gi consulted, eliquis on hold  - s/p egd / colonoscopy 5/27 - distal duodenal AVM, no active bleeding, gastric erosion/gastritis - no active bleeding  - PPI    JASON   - mgmt per n

## 2021-06-02 NOTE — PHYSICAL THERAPY NOTE
PHYSICAL THERAPY TREATMENT NOTE - INPATIENT    Room Number: 2019/9241-Q     Session: 1   Number of Visits to Meet Established Goals: 3    Presenting Problem: NSTEMI, gi hemorrhage    Problem List  Principal Problem:    Gastrointestinal hemorrhage, unspeci O2 WALK                  AM-PAC '6-Clicks' INPATIENT SHORT FORM - BASIC MOBILITY  How much difficulty does the patient currently have. ..  -   Turning over in bed (including adjusting bedclothes, sheets and blankets)?: None   -   Sitting som contacting [facility] services for caregiver assist to decreased burden on spouse or daughter. Pt agreeable to HHPT if covered by medicare - referred to SW/CM.        Axillary Transfers/Environmental Barriers    Toilet/Commode Transfers: SBA   Stairs: NA  Curb

## 2021-06-02 NOTE — CARDIAC REHAB
CAD/MI education completed with patient. Patient referred to outpatient cardiac rehabilitation program.  Patient reluctant to enroll at this time. Provided patient with [facility] Cardiac Rehab contact information.

## 2021-06-02 NOTE — PROGRESS NOTES
[address] Patient Status:  Inpatient    1945 MRN [id number]   [facility] 8NE-A Attending Maximo Harris MD   [facility] Day # 8 PCP Kika Roberts, DO     SUBJECTIVE: doing better overall.  weak     OBJECTIV mg, 0.2 mg, Intravenous, Q2H PRN **OR** HYDROmorphone HCl (DILAUDID) 1 MG/ML injection 0.4 mg, 0.4 mg, Intravenous, Q2H PRN **OR** HYDROmorphone HCl (DILAUDID) 1 MG/ML injection 0.8 mg, 0.8 mg, Intravenous, Q2H PRN     General appearance: alert, appears st

## 2021-06-02 NOTE — PROGRESS NOTES
[facility] [address] [place] [address] Cardiology  Progress Note    Manueljona Schlatter Patient Status:  Inpatient    1945 MRN [id number]   [facility] 8NE-A Attending Maximo Harris MD   [facility] Day # 8 PCP Kika Roberts DO     Impression:  1.  NSTE alert; in no acute distress  Cardiac: Regular rate and regular rhythm; no murmurs/rubs/gallops are appreciated  Lungs: Clear to auscultation bilaterally; no accessory muscle use  Abdomen: Soft, non-tender; bowel sounds are normoactive  Extremities: No club 06/02/2021     06/02/2021    CO2 22.0 06/02/2021    BUN 27 06/02/2021    CREATSERUM 1.49 06/02/2021     06/02/2021    CA 9.0 06/02/2021    MG 2.6 06/02/2021       Telemetry: No malignant tachyarrhythmias or bradyarrhythmias      Thank you for

## 2021-06-02 NOTE — PROGRESS NOTES
[facility]    Nephrology Progress Note    Areta Schlatter Attending:  Maximo Harris MD       SUBJECTIVE:     Feels weak and states she does not feel she has to urinate as much    PHYSICAL EXAM:     Vital Signs: /69 (BP Location: Left LYMPHABS 1.38 05/28/2021    EOSABS 0.00 05/28/2021    BASABS 0.00 05/28/2021    NEUT 68 05/28/2021    LYMPH 23 05/28/2021    MON 8 05/28/2021    EOS 0 05/28/2021    BASO 0 05/28/2021    NEPERCENT 69.6 05/27/2021    LYPERCENT 14.6 05/27/2021    MOPERCENT 13 Intravenous, Q10 Min PRN  hydrALAzine HCl (APRESOLINE) injection 10 mg, 10 mg, Intravenous, Q2H PRN  ondansetron HCl (ZOFRAN) injection 4 mg, 4 mg, Intravenous, Q6H PRN   Or  Metoclopramide HCl (REGLAN) injection 5 mg, 5 mg, Intravenous, Q8H PRN  HYDROmorp Bianca Grider, [address] [facility] Nephrology  Replaced by [facility] 93  29 [facility]  [place], [address]    6/2/2021  12:27 PM

## 2021-06-02 NOTE — PLAN OF CARE
Assumed care of pt at 1115. Pt A&Ox4. NSR on tele. Lung sounds clear and diminished on room air bilaterally. Pt denies SOB. Verbalizes pain in her right hip. At 1200 pt verbalized that she was having pain and swelling in her lower extremities.  Pulses prese Achieves maximal functionality and self care  Description: INTERVENTIONS  - Monitor swallowing and airway patency with patient fatigue and changes in neurological status  - Encourage and assist patient to increase activity and self care with guidance from manage pain on discharge\"    Interventions:  - Comply with medical regimen   - See additional Care Plan goals for specific interventions  Outcome: Progressing  Goal: Patient/Family Short Term Goal  Description: Patient's Short Term Goal: \"To go home\"

## 2021-06-03 NOTE — CM/SW NOTE
DINA met with pt at bedside [facility]. Pt agreeable to using [facility] at time of discharge. Liaison made aware of this update. AVS updated with their contact information. Reserved in [address] Jolene Mccullough

## 2021-06-03 NOTE — HOME CARE LIAISON
Met with patient at beside to confirm [facility] [address] [place] [address] Blayne Rees at discharge. Patient agreeable, pertinent information confirmed and [facility] pamphlet left at bedside.

## 2021-06-03 NOTE — PROGRESS NOTES
[facility] Hospitalist Progress Note   CC: follow-up hospital admission - syncope     SUBJECTIVE:  Interval History:    Russo placed      OBJECTIVE:  Scheduled Meds:   • furosemide  40 mg Oral Daily   • Rosuvastatin Calcium  10 mg Oral Nightly   • metoprolol succi HCT 27.5*  --  27.3*  --   --   --  27.9* 29.4* 29.6*   .0   < > 333.0   < > 426.0 473.0* 517.0*  517.0* 500.0* 499.0*    < > = values in this interval not displayed.        Chem:   Recent Labs   Lab 05/30/21  6500 05/31/21  0631 06/01/21  5004 06/ acidosis w/ a lactic acid of 14.     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

## 2021-06-03 NOTE — PLAN OF CARE
AO x4. Calm, cooperative. Up x2, gait belt, and walker. NSR/ST on cardiac monitor. Adequate saturation on room air. Lung sounds diminished. Denies chest discomfort, nausea/vomiting. Report hip pain, dilaudid given. Low urine output.  Skin has scattered brui care  Description: INTERVENTIONS  - Monitor swallowing and airway patency with patient fatigue and changes in neurological status  - Encourage and assist patient to increase activity and self care with guidance from PT/OT  - Encourage visually impaired, he in tolerated activity level and precautions  6/2/2021 2237 by Caprice Gray RN  Outcome: Progressing  6/2/2021 2235 by Caprice Gray, RN  Outcome: Progressing     Problem: Patient/Family Goals  Goal: Patient/Family Long Term Goal  Description: Patient's L

## 2021-06-03 NOTE — PROGRESS NOTES
[facility] Cardiology  Progress Note    Veronika Vanjaymie Patient Status:  Inpatient    1945 MRN [id number]   [facility] 8NE-A Attending Mary Anne Nagel MD   [facility] Day # 9 PCP Giuseppe Sow DO     Impression:  1.  NSTE lb (84.8 kg)      General: Awake and alert; in no acute distress  Cardiac: Regular rate and regular rhythm; no murmurs/rubs/gallops are appreciated  Lungs: Clear to auscultation bilaterally; no accessory muscle use  Abdomen: Soft, non-tender; bowel sounds  06/03/2021    K 5.5 06/03/2021     06/03/2021    CO2 24.0 06/03/2021    BUN 24 06/03/2021    CREATSERUM 1.54 06/03/2021     06/03/2021    CA 9.4 06/03/2021    MG 2.3 06/03/2021       Telemetry: No malignant tachyarrhythmias or bradya

## 2021-06-03 NOTE — PROGRESS NOTES
[facility]    Nephrology Progress Note    Jose Alejandro Orozco Attending:  Neha Saleh MD       SUBJECTIVE:     Urinary retention; had ojeda placed  Persistent hyperkalemia noted despite veltassa  Leg swelling a little better after lasix yesterday, Susan Guzman RDW 20.2 (H) 06/03/2021    NEPRELIM 3.65 05/28/2021    NEUTABS 4.08 05/28/2021    LYMPHABS 1.38 05/28/2021    EOSABS 0.00 05/28/2021    BASABS 0.00 05/28/2021    NEUT 68 05/28/2021    LYMPH 23 05/28/2021    MON 8 05/28/2021    EOS 0 05/28/2021    BASO 0 05 mg, 650 mg, Rectal, Q4H PRN  Labetalol HCl (TRANDATE) injection 10 mg, 10 mg, Intravenous, Q10 Min PRN  hydrALAzine HCl (APRESOLINE) injection 10 mg, 10 mg, Intravenous, Q2H PRN  ondansetron HCl (ZOFRAN) injection 4 mg, 4 mg, Intravenous, Q6H PRN   Or  Met call with questions or concerns.         Monique Hatfield MD  [address] Nephrology  [facility] 93  29 [facility]  [place], [address]    6/3/2021  12:40 PM

## 2021-06-03 NOTE — CM/SW NOTE
Pt agreeable to [facility] at time of discharge. Pt requested [facility] from the [address] [place] [address] list. Leia Postal in [address] [place] [address]. Informed Carlin that pt will be discharging today. AVS to be sent once available. AVS updated with their contact information.     Ramandeep Tate

## 2021-06-03 NOTE — PLAN OF CARE
Patient aox3, ra, lung diminished, NSR, positive bowel sounds. Urinary retention PVR greater 900. Ojeda inserted 950 cc output at 1145 am. OK to keep ojeda per Dr Farhad Parisi. Consulted Dr Romelia Villegas for urinary retention. K 5.5, Veltassa given. Lasix 40 mg po daily. INTERVENTIONS  - Monitor swallowing and airway patency with patient fatigue and changes in neurological status  - Encourage and assist patient to increase activity and self care with guidance from PT/OT  - Encourage visually impaired, hearing impaired and

## 2021-06-04 NOTE — PROGRESS NOTES
[facility] Cardiology  Office Progress Note    Gabriella Tomlinson Patient Status:  Inpatient    1945 MRN [id number]   [facility] 8NE-A Attending Lissett Kendrick MD   [address] Day # 10 PCP Jacky Zapien DO     Impression: kg)  04/08/21 : 187 lb (84.8 kg)      General: Awake and alert; in no acute distress  Cardiac: Regular rate and regular rhythm; no murmurs/rubs/gallops are appreciated  Lungs: Clear to auscultation bilaterally; no accessory muscle use  Abdomen: Soft, non-t K 4.3 06/04/2021     06/04/2021    CO2 24.0 06/04/2021    BUN 23 06/04/2021    CREATSERUM 1.55 06/04/2021     06/04/2021    CA 8.9 06/04/2021    MG 2.0 06/04/2021       Telemetry: No malignant tachyarrhythmias or bradyarrhythmias      Thank

## 2021-06-04 NOTE — PROGRESS NOTES
06/04/21 0956   Mobility   O2 walk?  Yes   SPO2% on Room Air at Rest 88   SPO2% on Oxygen at Rest 94   At rest oxygen flow (liters per minute) 2   SPO2% Ambulation on Room Air 88   SPO2% Ambulation on Oxygen 97

## 2021-06-04 NOTE — PROGRESS NOTES
[facility]    Nephrology Progress Note    Gabriella Tomlinson Attending:  Vito Reyes MD       SUBJECTIVE:     Wearing o2  Mild leg swelling  Tolerating ojeda  Expresses concerns about going home and coming back    PHYSICAL EXAM:     Vital Signs:  B 05/28/2021    LYMPHABS 1.38 05/28/2021    EOSABS 0.00 05/28/2021    BASABS 0.00 05/28/2021    NEUT 68 05/28/2021    LYMPH 23 05/28/2021    MON 8 05/28/2021    EOS 0 05/28/2021    BASO 0 05/28/2021    NEPERCENT 69.6 05/27/2021    LYPERCENT 14.6 05/27/2021 650 MG rectal suppository 650 mg, 650 mg, Rectal, Q4H PRN  Labetalol HCl (TRANDATE) injection 10 mg, 10 mg, Intravenous, Q10 Min PRN  hydrALAzine HCl (APRESOLINE) injection 10 mg, 10 mg, Intravenous, Q2H PRN  ondansetron HCl (ZOFRAN) injection 4 mg, 4 mg, allowing me to participate in the care of this patient. Please do not hesitate to call with questions or concerns.         Vijaya Liang MD  [address] Nephrology  [facility] 93  29 [facility], [address] [place] [address]    6/4/2021  12:48 PM

## 2021-06-04 NOTE — CM/SW NOTE
O2 approved by Tyrell Puckett. Reserved in [id number]. AVS updated with their contact information. RN to call RT to dispense an [employer] tank. Pt to be instructed to call Tyrell Puckett when she gets home.      Tyrell Puckett: [phone]

## 2021-06-04 NOTE — PHYSICAL THERAPY NOTE
PHYSICAL THERAPY TREATMENT NOTE - INPATIENT    Room Number: 6983/5450-O     Session: 2   Number of Visits to Meet Established Goals: 3    Presenting Problem: NSTEMI, gi hemorrhage    Problem List  Principal Problem:    Gastrointestinal hemorrhage, unspeci +    ACTIVITY TOLERANCE   HR @ rest: 83-88  SpO2 @ rest: 99  BP @ rest: 128/61  Pulse @ rest: 87                      O2 WALK       AM-PAC '6-Clicks' INPATIENT SHORT FORM - BASIC MOBILITY  How much difficulty does the patient currently have. ..  -   Turning pumps  Hip AB/AD  Heel raises  Heel slides  Hip adduction squeezes  LAQ  Quad sets     Upper Extremity      Position Sitting     Repetitions   10   Sets   1     Patient End of Session: Up in chair;Needs met;Call light within reach;RN aware of session/findi

## 2021-06-04 NOTE — PROGRESS NOTES
[facility] Hospitalist Progress Note   CC: follow-up hospital admission - syncope     SUBJECTIVE:  Interval History:   No chest pain, palpitations, new shortness of breath, cough, nausea, vomiting.        OBJECTIVE:  Scheduled Meds:   • furosemide  40 mg Oral Karena 517.0*  517.0* 500.0* 499.0*    < > = values in this interval not displayed.        Chem:   Recent Labs   Lab 05/31/21  0631 06/01/21  0508 06/02/21  0518 06/03/21  0529 06/04/21  0520    136 137 137 137   K 4.2 4.3 5.4* 5.5* 4.3    106 108 106 Acute respiratory failure   Severe lactic acidosis  NSTEMI  Acute systolic CHF   Acute on chronic anemia   - s/p egd / colonoscopy  - distal duodenal AVM, no active bleeding, gastric erosion/gastritis - no active bleeding  JASON   Renal infarcts - was on e erosion/gastritis - no active bleeding  - PPI  - hgb stable    JASON   - cr stable 1.5 w K 5.5 on veltassa, ace held, ojeda placed--> potassium wnl today and creat stable    Renal Artery stenosis   - consulted vascular surgery - see above   - oupt follow up

## 2021-06-04 NOTE — PROGRESS NOTES
[facility]    Progress Note    Marisa Silence Patient Status:  Inpatient    1945 MRN [id number]   [facility] 8NE-A Attending Kim Heck MD   [facility] Day # 8 PCP Sourav Power DO     HPI/Subjective:   Marisa Silence is 11/23/2020    MG 2.0 06/04/2021    PHOS 3.5 06/04/2021    [facility] 05/26/[facility]       No results found.         Assessment & Plan:     URINE RETENTION     Home with ojeda   Bowel regimen  Plan outpt void trial with urology  Consider urodynamic studie

## 2021-06-04 NOTE — CM/SW NOTE
O2 order and supporting documentation sent to Carlos Schaefer in [facility]. Await acceptance.      KALLI Acosta, [facility]   / Discharge Planner  [id number]

## 2021-06-04 NOTE — PROGRESS NOTES
06/04/21 0956   Mobility   O2 walk?  Yes   SPO2% on Room Air at Rest 88   SPO2% on Oxygen at Rest 97   At rest oxygen flow (liters per minute) 2   SPO2% Ambulation on Room Air 88   SPO2% Ambulation on Oxygen 94

## 2021-06-04 NOTE — PLAN OF CARE
Pt is A&Ox4, 2L Noc RA during the day. NSR on tele, denies cardiovascular symptoms. LBM 6/2, Russo placed today draining yellow clear urine. Low K cardiac diet. IV dilaudid for hip pain. Up x2 walker, fall precautions in place bed alarm on.  Labs in AM    0 assistive/communication devices  Outcome: Progressing     Problem: CARDIOVASCULAR - ADULT  Goal: Maintains optimal cardiac output and hemodynamic stability  Description: INTERVENTIONS:  - Monitor vital signs, rhythm, and trends  - Monitor for bleeding, hyp Patient's Short Term Goal: \"feel better\"    Interventions:   - Report new/worsening pain  - Take prescribed pain meds as needed  - Attend all follow up appointments post discharge  - See additional Care Plan goals for specific interventions  Outcome: Pro

## 2021-06-05 NOTE — PLAN OF CARE
Up in [place] noted with activity  2l O2 sats 96%,O2 walk done  [employer] approve for home Oxygen  Ojeda catheter education given to pt and 2 daughters at bedside, spouse at bedside,pt going home with ojeda  Pt verbalized understanding ojeda car

## 2021-06-05 NOTE — PLAN OF CARE
NURSING DISCHARGE NOTE    Discharged Home via Wheelchair. Accompanied by Support staff  Belongings Taken by patient/family   .

## 2021-06-07 NOTE — PROGRESS NOTES
[facility] Follow up Call  1. How are you doing now that you're home? Pt still has indwelling catheter and home O2, 1-1.5 liter. Home Health has been initiated. 2. Since leaving the hospital, have you been able to get your prescriptions filled? Yes, daughter who is a nurse is helping with med set up(pill container)    3. Do you have any questions about your medications? No      4. Have you experienced any significate changes since going home? Yes, slowly increasing nutrition. 5. Are you walking at home more and more each day? Yes, has a Wheelchair and walker to help for distances. 6. Do you have appointments with your primary MD (1 week), cardiologist (2 weeks)? No, not yet, but daughter will make appointments. 7. Do you have an appointment made to start Cardiopulmonary rehab? (2-3 weeks)? No, not able to afford co-pay, is going to work with home health on exercises for strength. 8. Are there any other questions or concerns you have today? No further questions at this time.

## 2021-06-10 PROBLEM — I50.22 CHRONIC SYSTOLIC HEART FAILURE (HCC): Status: ACTIVE | Noted: 2021-01-01

## 2021-06-10 PROBLEM — I25.10 CORONARY ARTERY DISEASE INVOLVING NATIVE CORONARY ARTERY OF NATIVE HEART WITHOUT ANGINA PECTORIS: Status: ACTIVE | Noted: 2021-01-01

## 2021-06-11 NOTE — DISCHARGE SUMMARY
[address] SUMMARY     [facility] Patient Status:  Inpatient    1945 MRN [id number]   [place] 8NE-A Attending No att. providers found   [facility] Day # 10 PCP Yohana Resendiz DO     Date of Admission: 2021 possible sepsis with multiple organ failure. Patient also with nstemi with cath showing complex cad with medical management recommendation given patient clinical picture. Patient with asa was held as well as eliquis due to anemia and concern of GI bleed.  A acid 1 MG Tabs  Commonly known as: FOLVITE      Take 2 tablets (2 mg total) by mouth daily.    Quantity: 180 tablet  Refills: 3     Prochlorperazine Maleate 10 mg tablet  Commonly known as: COMPAZINE      Take 1 tablet (10 mg total) by mouth every 6 (six) h their entire visit, only to be removed if asked to do so by your provider. We are working to limit the number of people in our waiting rooms and ask that patients do not bring anyone with them to their appointment unless clinically required.         [place] swelling noted.  Non tender bilateral LE  Psychiatric: Appropriate mood and affect  -----------------------------------------------------------------------------------------------  PATIENT DISCHARGE INSTRUCTIONS: See electronic chart    Plan  SIRS possible up     Renal infarcts - on eliquis pta  - see above, uro / vasc consulted, will need cad/nstemi/chf addressed   - oupt follow up      Syncope   - ? 2/2 cardiac etiology vs hypovolemia, less likely PE as she was compliant on eliquis      Hx of CVA w/ chroni

## 2021-06-21 PROBLEM — R79.89 ELEVATED TROPONIN: Status: ACTIVE | Noted: 2021-01-01

## 2021-06-21 PROBLEM — R77.8 ELEVATED TROPONIN: Status: ACTIVE | Noted: 2021-01-01

## 2021-06-21 PROBLEM — N28.9 RENAL INSUFFICIENCY: Status: ACTIVE | Noted: 2021-01-01

## 2021-06-21 PROBLEM — E87.1 HYPONATREMIA: Status: ACTIVE | Noted: 2021-01-01

## 2021-06-21 NOTE — PROGRESS NOTES
Lives in senior independent housing w/ spouse , current with residential home health, alert , fatigued, states had labs drawn on Saturday, and was called today to come into hospital , IV fluids, infusing at prescribed rate , encouraged to drink water , ins

## 2021-06-21 NOTE — ED INITIAL ASSESSMENT (HPI)
Patient was having routine blood work on Saturday. Today, received call that sodium 126. (+) nausea, some vomiting that started last night. Vomited x1 this morning. No fevers.  Recent MI.

## 2021-06-21 NOTE — CONSULTS
[facility]    Report of Consultation    [place] Ours Patient Status:  Inpatient    1945 MRN [id number]   [facility] 4NW-A Attending Tommy Muse MD   Hosp Day # 0 PCP Sharon Proper, DO     Date of consult: 2021    JOSETTE HISTORY Bilateral     bilateral wrist cyst removed   • REMOVAL OF LUNG,LOBECTOMY      lower  right  lung     Family History   Problem Relation Age of Onset   • Stroke Father    • Diabetes Mother    • Hypertension Mother    • No Known Problems Daughter    • infusion, , Intravenous, Continuous  No current outpatient medications on file.         PHYSICAL EXAM:     Vital Signs: /67 (BP Location: Left arm)   Pulse 70   Temp 97.9 °F (36.6 °C) (Oral)   Resp 16   Ht 5' 3\" (1.6 m)   Wt 180 lb (81.6 kg)   SpO2 9 06/07/2021    MON 16 06/07/2021    EOS 0 06/07/2021    BASO 0 06/07/2021    NEPERCENT 66.4 06/21/2021    LYPERCENT 14.9 06/21/2021    MOPERCENT 11.3 06/21/2021    EOPERCENT 3.7 06/21/2021    BAPERCENT 3.0 06/21/2021    NE 6.53 06/21/2021    LYMABS 1.46 06/ Urine lytes w Cr < 13 so not good sample, recheck    JASON   -- likely hypovolemic and cr rise due to bactrim. Hold lasix. Russo in place. IVFs.  Monitor     BRIAN / Renal infarct / AAA:  -- was seen by vascular on prior admit and was on apixaban but now appear

## 2021-06-21 NOTE — ED PROVIDER NOTES
Patient Seen in: [facility] Emergency Department      History   Patient presents with:  Abnormal Labs    Stated Complaint: low sodium - 126 mg/dl / sent by nephrologist    HPI/Subjective:   HPI    Patient with multiple medical issues.   Patient has h Fortunately, the patient's senior care center was not damaged. No new shortness of breath. Patient is on 1 L nasal cannula oxygen. Patient denies any high fever or shaking chills. No cough. No abdominal pain.   Patient has just completed Bactrim antibio Physical Exam  General: The patient is awake and alert. She answers questions appropriately  Eyes: sclera white, conjunctiva pink and moist.  Lids and lashes are normal.  Neck: With no JVD  Lungs: Clear to auscultation bilaterally.   No rhonchi or PLATELET    Narrative: The following orders were created for panel order CBC With Differential With Platelet.   Procedure                               Abnormality         Status                     ---------                               ----------- Impression:  Hyponatremia  (primary encounter diagnosis)  Renal insufficiency  Anemia, unspecified type  Thrombocytopenia (HCC)  Elevated troponin     Disposition:  Admit  6/21/2021 11:58 am    Follow-up:  No follow-up provider specified.         Medication

## 2021-06-21 NOTE — H&P
[facility]    History and Physical     Cranston Frankel Patient Status:  Emergency    1945 MRN [id number]   Location [facility] Attending Jorge Valencia MD   Hosp Day # 0 PCP Jostin Jarrell DO     Chief Complaint: 28.50 pack-year smoking history. She has never used smokeless tobacco. She reports that she does not drink alcohol and does not use drugs.     Family History:   Family History   Problem Relation Age of Onset   • Stroke Father    • Diabetes Mother    • Hyper 10 MG Oral Tab, Take 10 mg by mouth daily. , Disp: , Rfl:   Prochlorperazine Maleate (COMPAZINE) 10 mg tablet, Take 1 tablet (10 mg total) by mouth every 6 (six) hours as needed for Nausea.  Every 6 hours prn nausea or as instructed by physician, Disp: 30 TROP 0.063*       Imaging: Imaging data reviewed in Epic.       ASSESSMENT / PLAN:     Kristen Wright is a 66year old female who presents with acute hyponatremia & JASON     Problem List / Diagnoses    Hyponatremia  JASON on CKD 3  COPD  Chronic respirat

## 2021-06-22 NOTE — BH PROGRESS NOTE
[facility] Resource Referral Counselor Note    Jemima Heller Patient Status:  Inpatient    1945 MRN [id number]   [facility] 4NW-A Attending Vicenta Richards MD   Hosp Day # 1 PCP DO ANGE Damian(subjective) The

## 2021-06-22 NOTE — PROGRESS NOTES
Pt is alert though extremely fatigued and forgetful. IVF discontinued. Repeat sodium drawn, not received by lab. Another stat sodium ordered, Na: 124. Results replayed to Nephrology. Nephrology content with result, no new orders. Adequate ojeda output.  [place] Pavy

## 2021-06-22 NOTE — PROGRESS NOTES
[facility]    Progress Note     Sayra Lovell Patient Status:  Inpatient    1945 MRN [id number]   [facility] 4NW-A Attending Alma Andres MD   Hosp Day # 1 PCP Danuta Nicholas DO     Chief Complaint: Patient presents with: --  27.0 26.0 28.0   ALKPHO 134  --   --   --   --   --    AST  --   --  149*  --   --   --    *  --   --   --   --   --    BILT 1.1  --   --   --   --   --    TP 7.4  --   --   --   --   --     < > = values in this interval not displayed.        No Renal Infarcts  History GI Bleed   - apixaban held last admission due to GIB  - reviewed records, per GI ok to restart [facility], will restart 6/22 & observe      DVT PPx:  apixaban   Code Status: FULL      Dispo: >2 midnights anticipated      Tommas Antis

## 2021-06-22 NOTE — PLAN OF CARE
Pt is alert and oriented x4, forgetful at times. IVF restarted per nephrology; see MAR. 1800 mL fluid restriction. VSS, 1L O2 (baseline). Repeat urine sent. Adequate ojeda output, urine is clear, yellow. Pt complains of shoulder/back/leg pain.  Medication g

## 2021-06-22 NOTE — DIETARY NOTE
[facility]    NUTRITION ASSESSMENT    Pt does not meet malnutrition criteria. NUTRITION INTERVENTION:  1. Meal and Snacks - monitor patient po intake. Encourage adequate po of appropriate diet and to have small frequent meals.    2. Medical Food Sup Fat/Muscle Mass: well nourished      2.  Fluid Accumulation: none     NUTRITION PRESCRIPTION: ABW of 81.6kg  Calories: 8987-6631 calories/day (20-22 calories per kg)  Protein: 81-98 grams protein/day (1.0-1.2 grams protein per kg)  Fluid: ~1 ml/kcal or per

## 2021-06-22 NOTE — OCCUPATIONAL THERAPY NOTE
OCCUPATIONAL THERAPY EVALUATION - INPATIENT     Room Number: 423/423-A  Evaluation Date: 6/22/2021  Type of Evaluation: Initial  Presenting Problem: hyponatremia    Physician Order: IP Consult to Occupational Therapy  Reason for Therapy: ADL/IADL Dysfuncti showerhead             Drives: No       Prior Level of Function: Pt reported living at home with  in 1 level condo in senior independent building.  Pt reported daughter lives 10 min a day and comes periodically during the mornings to help pt with chapo Degree of Impairment: 32.79%  Standardized Score (AM-PAC Scale): 44.27  CMS Modifier (G-Code): CJ    FUNCTIONAL TRANSFER ASSESSMENT  Supine to Sit : Not tested  Sit to Stand: Contact guard assist    Skilled Therapy Provided: Pt received sitting in chair.  P performance deficits    Client Assessment/Performance Deficits LOW - No comorbidities nor modifications of tasks    Clinical Decision Making LOW - Analysis of occupational profile, problem-focused assessments, limited treatment options    Overall Complexit

## 2021-06-22 NOTE — PHYSICAL THERAPY NOTE
PHYSICAL THERAPY EVALUATION - INPATIENT     Room Number: 423/423-A  Evaluation Date: 6/22/2021  Type of Evaluation: Initial  Physician Order: PT Eval and Treat    Presenting Problem: hyponatremia  Reason for Therapy: Mobility Dysfunction and Discharg Left     knee scope cleaned up   • OTHER SURGICAL HISTORY Bilateral     bilateral wrist cyst removed   • REMOVAL OF LUNG,LOBECTOMY      lower  right  lung       HOME SITUATION  Type of Home: Condo   Home Layout: One level      Lives With: Spouse  Drives: N -   Moving from lying on back to sitting on the side of the bed?: None   How much help from another person does the patient currently need. ..   -   Moving to and from a bed to a chair (including a wheelchair)?: A Little   -   Need to walk in [facility] compression fracture in mid May. She has limited mobilities at home with c/o pain along her midback radiating to the R LE limiting her ambulation and functional skills at home.  In this PT evaluation, the patient presents with the following impairments: dec

## 2021-06-22 NOTE — PROGRESS NOTES
[facility]    Nephrology Progress Note    Jorene Rubinstein Attending:  Suly Nolen MD     Cc: Carolina Oliveira, hyponatremia     SUBJECTIVE     Feels ok no complaints     PHYSICAL EXAM   Vital signs: /67 (BP Location: Left arm)   Pulse 71   Temp 97.8 ° CA 9.1 06/22/2021    ALB 3.0 06/22/2021    MG 2.1 06/22/2021    PHOS 2.9 06/22/2021       IMAGING   All imaging studies personally reviewed.     No orders to display      ASSESSMENT & PLAN   66year old female w ho HTN, HL, CVA, non-small cell lung ca (lobe

## 2021-06-23 NOTE — PROGRESS NOTES
Pt is alert and oriented x4, can be forgetful at times. IVF running per Nephrology. VSS, remains afebrile. 2L NC, O2 sats remain above 92%. Complains of back pain, mediation given per MAR. Bed alarm on, call light within reach.

## 2021-06-23 NOTE — HOME CARE LIAISON
Ptnt current with [facility] for  Sn/pt  Will need a VIDA order on or before dc    Thanks  Elida Christine

## 2021-06-23 NOTE — CM/SW NOTE
06/23/21 1000   CM/SW Referral Data   Referral Source Social Work (self-referral)   Reason for Referral Discharge planning   Informant Patient   Patient Info   Patient's Mental Status Alert;Oriented   Patient's Home Environment House   Patient lives wit

## 2021-06-23 NOTE — PROGRESS NOTES
[facility]    Progress Note     Amanjose [place] Patient Status:  Inpatient    1945 MRN [id number]   [facility] 4NW-A Attending Lynn Veronica MD   Hosp Day # 2 PCP Venu Hammond DO     Chief Complaint: Patient presents with: --   --  2.9*   *  --    < > 124*  124*   < > 124* 125* 127*  127*   K  --  3.6   < > 3.4*   < > 3.8 3.7 3.7  3.7   CL 87*  --    < > 90*   < > 91* 92* 92*  92*   CO2 24.0  --    < > 28.0   < > 25.0 26.0 27.0  27.0   ALKPHO 134  --   --   --   -- made to pursue medical mgmt only   - likely at baseline given above JASON/CKD   - cont home asa, metoprolol, rosuvastatin, zetia   - holding diuresis per above; appears euvolemic on exam     Chronic Pain  - home norco     Renal Infarcts  History GI Bleed   -

## 2021-06-23 NOTE — PROGRESS NOTES
[facility]    Nephrology Progress Note    Tanvir Thompson Attending:  Karin Burgos MD     Cc: Jose Miguel Heady, hyponatremia     SUBJECTIVE     Had some nausea, got meds  Some constipation, getting miralax  Eating   No vomiting   No pain     PHYSICAL EXAM 06/23/2021    ALB 2.9 06/23/2021    MG 2.0 06/23/2021    PHOS 3.0 06/23/2021       IMAGING   All imaging studies personally reviewed.     No orders to display      ASSESSMENT & PLAN   66year old female w ho HTN, HL, CVA, non-small cell lung ca (lobectomy 2

## 2021-06-23 NOTE — PLAN OF CARE
Pt received alert and oriented x4. Vital signs stable. Pt reporting lower back pain radiating down R leg, PRN norco given with relief. Russo intact and draining clear yellow urine. Ambulating with stand-by assist. IVF infusing per MAR.  Albumin infusing per

## 2021-06-24 NOTE — PLAN OF CARE
A&Ox4, RA, no tele. Urine sample collected and sent to lab. Na+ es Yung MD notified. RFA infusing NS 0.9 @ 75ml. Left extended IV removed, unable to flush. CO of pain, tx w/ prn norco w/ relief. Denies nausea and SOB.  Call light w/in reach, bed in [facility]

## 2021-06-24 NOTE — CM/SW NOTE
BPCI-Advanced Medicare Program Note:  Plan of care reviewed for care coordination and discharge planning. Noted patient falls under  BPCI/Medicare program, with DRG Gastrointestinal Hemorrhage, 377.    NSOC recommendations for Home with Home Health pending

## 2021-06-24 NOTE — PLAN OF CARE
Pt received alert and oriented x4. Vital signs stable. Chronic lower back pain radiating down R leg, PRN norco given with relief. IVF infusing per MAR. Tolerating diet, 1800 ml fluid restriction. 1L O2 via NC PRN.  Russo catheter intact and draining clear y

## 2021-06-24 NOTE — PHYSICAL THERAPY NOTE
PHYSICAL THERAPY TREATMENT NOTE - INPATIENT    Room Number: 423/423-A     Session: 1   Number of Visits to Meet Established Goals: 3    Presenting Problem: hyponatremia    History related to current admission:       Admitted on 5/25/2021 from home with 2 removed   • REMOVAL OF LUNG,LOBECTOMY      lower  right  lung       SUBJECTIVE  \"They wheel me to the bathroom and I am able to transfer to the toilet\"     Patient’s self-stated goal is to return home     [address] supervision. Pt limited by back and RLE, and decreased endurance, O2 sats on 1L O2NC >93%. Pt left in chair, needs met.      THERAPEUTIC EXERCISES  Lower Extremity Alternating marching  Ankle pumps  Hip AB/AD  Knee extension  LAQ     Upper Extremity Scapul

## 2021-06-24 NOTE — PROGRESS NOTES
[facility]    Nephrology Progress Note    Syed Chilel Attending:  Elier Todd MD     Cc: Juancarlos Matters, hyponatremia     SUBJECTIVE     Laying in bed comfortably not in acute distress.     PHYSICAL EXAM   Vital signs: /61 (BP Location: Left arm) 06/24/2021     06/24/2021    K 4.7 06/24/2021    K 4.7 06/24/2021    CL 95 06/24/2021    CL 95 06/24/2021    CO2 27.0 06/24/2021    CO2 27.0 06/24/2021     06/24/2021     06/24/2021    CA 8.9 06/24/2021    CA 8.9 06/24/2021    ALB 3.4 0

## 2021-06-24 NOTE — PROGRESS NOTES
[facility]    Progress Note     Annamarie Billings Patient Status:  Inpatient    1945 MRN [id number]   [facility] 4NW-A Attending Sabrina Churchill MD   Hosp Day # 3 PCP Jyl Jeans, DO     Chief Complaint: Patient presents with: CA  --  8.8  --    < > 8.5   < > 8.4*  8.4*   < > 8.7 8.6 8.9  8.9   ALB   < > 3.2*  --   --  3.0*  --  2.9*  --   --   --  3.4   NA  --  119*  --    < > 124*  124*   < > 127*  127*   < > 126* 127* 127*  127*   K  --   --  3.6   < > 3.4*   < > 3.7  3.7    COPD  Chronic Respiratory Failure   - stable on home 1L O2  - no evidence of exacerbation  - cont home meds     CAD s/p recent NSTEMI  Troponin Elevation   CHF  ICM  - trops minimally elevated in ED, repeat stable, effectively rules out ACS  - s/p [facility]

## 2021-06-25 NOTE — PLAN OF CARE
A&Ox4, 1L02 @ night, no tele. Unable to pass stool tonight. Russo intact. Prn norco given for 8/10 pain on the right lower back. Denies nausea and SOB. RFA IV dressing changed, infusing NS 75ml/hr. Slight pitting edema noted in BLE.  Call light w/in reach,

## 2021-06-25 NOTE — PROGRESS NOTES
[facility]    Nephrology Progress Note    Sayra Lovell Attending:  Alma Andres MD     Cc: Joaquin Shearer, hyponatremia     SUBJECTIVE     Laying in bed comfortably not in acute distress.     PHYSICAL EXAM   Vital signs: /78 (BP Location: Left arm) 06/25/2021    CO2 22.0 06/25/2021    CO2 22.0 06/25/2021    GLU 94 06/25/2021    GLU 94 06/25/2021    CA 8.7 06/25/2021    CA 8.7 06/25/2021    ALB 2.9 06/25/2021    MG 2.1 06/25/2021    PHOS 3.4 06/25/2021       IMAGING   All imaging studies personally re

## 2021-06-25 NOTE — PLAN OF CARE
NURSING DISCHARGE NOTE    Discharged Home via Wheelchair. Accompanied by Family member  Belongings Taken by patient/family. Pt received alert and oriented x4. Vital signs stable. Tolerating diet, 1800 ml fluid restriction. 1L O2 via NC PRN.  Russo ca

## 2021-06-30 NOTE — DISCHARGE SUMMARY
[facility] Discharge Summary    Patient ID:  Roselyn Rendon  [id number]  88 year old  7/26/1945    Admit date: 6/21/2021  Discharge date and time: 6/25/21  Attending Physician: Clement Arevalo MD  Primary Care Physician: Sharifa Asif DO identified.  After that hospitalization her eliquis had been held but during this admit her hgb was in higher range so eliquis was cautiously restarted. hgb remained stable while on it here.      Of note, she has had recent complex medical history including total) by mouth nightly.            Where to Get Your Medications      These medications were sent to [address], [address] [place] [address], [phone], [facility], [address] [place] [phone]    Ph

## 2021-07-02 PROBLEM — S32.020A CLOSED COMPRESSION FRACTURE OF L2 VERTEBRA, INITIAL ENCOUNTER (HCC): Status: ACTIVE | Noted: 2021-01-01

## 2021-07-04 NOTE — ED QUICK NOTES
Per daughter, when she sent in urine sample she obtained it directly from the ojeda after disconnecting from the bag.

## 2021-07-04 NOTE — ED PROVIDER NOTES
Patient Seen in: [facility] Emergency Department      History   Patient presents with:  Nausea/Vomiting/Diarrhea    Stated Complaint: diarrhea since thusday     HPI/Subjective:   HPI    28-year-old female presents to the emergency department with co reviewed. All other systems reviewed and negative except as noted above.     Physical Exam     ED Triage Vitals [07/04/21 0919]   /86   Pulse 81   Resp 18   Temp 97.8 °F (36.6 °C)   Temp src Temporal   SpO2 98 %   O2 Device Nasal cannula       Cu Urine >50 (*)     RBC Urine >10 (*)     Bacteria Urine 1+ (*)     Squamous Epi.  Cells Moderate (*)     WBC Clump Present (*)     All other components within normal limits   COMP METABOLIC PANEL (14) - Abnormal; Notable for the following components:    [place] CT, CT ABDOMEN PELVIS IV CONTRAST, NO ORAL (ER), 5/25/2021, 11:53 AM.  INDICATIONS:  diarrhea since thusday  TECHNIQUE:  Unenhanced multislice CT scanning was performed from the dome of the diaphragm to the pubic symphysis.   Dose reduction techniques were although proctitis is not excluded. There is a small amount of free fluid in the pelvis which is of uncertain significance. There is no free air or abscess detected. ABDOMINAL WALL:  No mass or hernia.   URINARY BLADDER:  There is a Russo catheter in the silhouette is stable. Interval removal of right internal jugular central line. Increased apical redistribution of pulmonary vasculature. Increased interstitial abnormalities in the lungs. Increased bibasilar opacities. No pneumothorax.             CONC her culture results. They are also advised to follow-up with her primary care physician for this culture results. We discussed that she could have a colonization and not a true urinary tract infection.   She will be placed on Keflex for a possible urinary

## 2021-07-04 NOTE — ED QUICK NOTES
Pt awake and alert, skin w/d,resps reg/unlabored. Pt appears comfortable seated in wheelchair. Awaiting supplies from lab for stool specimens to be done as an outpatient. Pt with no stool specimens here.

## 2021-07-04 NOTE — ED QUICK NOTES
Pt awake and alert, skin w/d,resps reg/unlabored. Pt appears comfortable but c/o chronic pain and requesting pain medication.     Discussed with Dr. Curt Cook and ok to administer norco.

## 2021-07-04 NOTE — ED INITIAL ASSESSMENT (HPI)
Pt presents to the ED with complaints of diarrhea since Thursday, abdominal bloating and cramping. Pt also reports that her urine is dark and cloudy, had ojeda changed during last admission when discharged on 6/25.  Pt had urine sample taken on Friday but u

## 2021-07-14 PROBLEM — I25.5 CARDIOMYOPATHY, ISCHEMIC: Status: ACTIVE | Noted: 2021-01-01

## 2021-07-24 PROBLEM — N28.9 ACUTE RENAL INSUFFICIENCY: Status: ACTIVE | Noted: 2021-01-01

## 2021-07-24 PROBLEM — R10.84 ABDOMINAL PAIN, GENERALIZED: Status: ACTIVE | Noted: 2021-01-01

## 2021-07-24 NOTE — CONSULTS
[address] [facility] - Nephrology  Inpatient Consultation    Exchange Ours Patient Status:  Observation    1945 MRN [id number]   [facility] 8NE-A Attending Roberto Carlos Evans MD   [facility] Day # 0 PCP Sharon Parker, 10 mg, Oral, Nightly    ondansetron 8 MG Oral Tablet Dispersible, Take 8 mg by mouth every 6 (six) hours as needed for Nausea., Disp: , Rfl: , 7/23/2021 at 2100  HYDROcodone-acetaminophen (NORCO) 5-325 MG Oral Tab, Take 1 tablet by mouth every 8 (eight) ho antineoplastic chemotherapy     had  to stop  chemo r/t side effects   • Pneumonia due to organism    • Renal failure    • Rheumatoid arthritis ([facility] [address]    • Stroke [facility]) 2010    RUE/RLE numbness   • Visual impairment     readers     Past Surgical History: EXAM:     Vital Signs: BP 95/58 (BP Location: Left arm)   Pulse 87   Temp 98.4 °F (36.9 °C) (Oral)   Resp 18   Ht 5' 3\" (1.6 m)   Wt 195 lb 1.7 oz (88.5 kg)   SpO2 98%   BMI 34.56 kg/m²   Temp (24hrs), Av.9 °F (36.6 °C), Min:97 °F (36.1 °C), Max:98.4 07/24/2021    HGB 9.8 (L) 07/24/2021    HCT 31.9 (L) 07/24/2021    PLT 42.0 (L) 07/24/2021    MCV 94.9 07/24/2021    MCH 29.2 07/24/2021    MCHC 30.7 (L) 07/24/2021    RDW 19.8 (H) 07/24/2021    NEPRELIM 6.04 07/24/2021    NEUTABS 9.26 (H) 06/07/2021    LY atelectasis.  No new pulmonary opacity.                        Impression   CONCLUSION:  Borderline pulmonary vascular congestion.  Stable elevation of right hemidiaphragm and right basilar atelectasis.                Dictated by (CST): Lolis Poole MD on

## 2021-07-24 NOTE — ED QUICK NOTES
Orders for admission, patient is aware of plan and ready to go upstairs. Any questions, please call ED RN Prince Dubin at extension [phone]. Vaccinated?  yes  Type of COVID test sent: n/a  COVID Suspicion level: Low      Titratable drug(s) infusing:  Rate: n/a

## 2021-07-24 NOTE — ED PROVIDER NOTES
Patient Seen in: [facility] Emergency Department      History   Patient presents with:  Nausea/Vomiting/Diarrhea  GI Bleeding    Stated Complaint: nvd foamy has chf    HPI/Subjective:   HPI    Patient is a pleasant 76-year-old female with significan • COLONOSCOPY N/A 5/27/2021    Procedure: COLONOSCOPY;  Surgeon: Guadalupe Espinoza MD;  Location: [address] [place] [address] ENDOSCOPY   • COLONOSCOPY     • KNEE SURGERY Left     knee scope cleaned up   • OTHER SURGICAL HISTORY Bilateral     bilateral wrist cyst removed   • RE tenderness. Nondistended. No rebound or guarding. Musculoskeletal:         General: Normal range of motion. Cervical back: Normal range of motion and neck supple. Skin:     General: Skin is warm and dry.       Comments: 2-3+ pitting edema bilater WITH CULTURE REFLEX   RAINBOW DRAW LAVENDER   RAINBOW DRAW LIGHT GREEN   RAINBOW DRAW GOLD     EKG    Rate, intervals and axes as noted on EKG Report. Rate: 79  Rhythm: Sinus Rhythm  Reading: No ST segment or T wave changes.  No old available for compariso admitted, for now we will gently rehydrate her, BNP is quite elevated but no pulmonary edema on chest x-ray and she is quite comfortable, no hypoxia.  No leukocytosis, mild anemia is at baseline and CT of her abdomen does not show any acute inflammatory pro

## 2021-07-24 NOTE — PLAN OF CARE
Assumed care of pt from ED around 0300. A/ox4, 3L NC, SR on tele. No reports of pain at this time. Breath sounds clear/diminished upon auscultation. +2 edema noted in bilateral lower extremities & feet. Skin check completed w/ second RN, Ulices Pastrana.  Right butt Cardiovascular  Goal: Maintains optimal cardiac output and hemodynamic stability  Description: INTERVENTIONS:  - Monitor vital signs and trends  - Administer ordered vasoactive medications  - Assess skin color and temperature  Outcome: Progressing  Goal: A

## 2021-07-24 NOTE — H&P
[facility]    History and Physical     Lindbergh Silence Patient Status:  Observation    1945 MRN [id number]   [facility] 8NE-A Attending Kim Heck MD   [address] Day # 0 PCP Sourav Power DO     Chief Complaint: Abdominal C Procedure: COLONOSCOPY;  Surgeon: Castro Thomson MD;  Location: [facility] ENDOSCOPY   • COLONOSCOPY     • KNEE SURGERY Left     knee scope cleaned up   • OTHER SURGICAL HISTORY Bilateral     bilateral wrist cyst removed   • REMOVAL OF LUNG,LOBECTOMY      lowe every evening., Disp: 60 tablet, Rfl: 0  apixaban 2.5 MG Oral Tab, Take 1 tablet (2.5 mg total) by mouth 2 (two) times daily. , Disp: 60 tablet, Rfl: 0  Metoprolol Succinate ER 50 MG Oral Tablet 24 Hr, Take 1 tablet (50 mg total) by mouth 2 (two) times a da tenderness of the LE  Integument: No rashes or lesions. Psychiatric: Appropriate mood and affect.       Diagnostic Data:      Labs:  Recent Labs   Lab 07/23/21  2250 07/24/21  0503   WBC 7.3 7.8   HGB 10.5* 9.8*   MCV 96.3 94.9   PLT 51.0* 42.0*       Rec Chronic Systolic HF  -cardiology consulted  -I/o  -pt with maria m as well, given IVF overnight, held this am  -LE edema worse at home  -no on diuretics at this time, follow renal and cardiac rec today    HLD  -zetia  -rosuvastatin     CAD hx  -medical managem

## 2021-07-24 NOTE — ED INITIAL ASSESSMENT (HPI)
Abd cramping that started today, with some nausea and spitting up \"white foam\"  +dizziness, nose bleeds

## 2021-07-24 NOTE — PROGRESS NOTES
07/24/21 0300 07/24/21 0306 07/24/21 0308   Vital Signs   /64 91/63 113/83   MAP (mmHg) 76  --   --    BP Location Left arm Left arm Left arm   BP Method Automatic Automatic Automatic   Patient Position Lying Sitting Standing

## 2021-07-24 NOTE — PHYSICAL THERAPY NOTE
PHYSICAL THERAPY EVALUATION - INPATIENT     Room Number: 8631/8432-T  Evaluation Date: 7/24/2021  Type of Evaluation: Initial  Physician Order: PT Eval and Treat    Presenting Problem: acute renal insufficiency  Reason for Therapy: Mobility Dysfuncti Procedure Laterality Date   • BACK SURGERY      lower back   •       x2   • COLONOSCOPY N/A 2021    Procedure: COLONOSCOPY;  Surgeon: Sammi Agarwal MD;  Location: [facility] ENDOSCOPY   • COLONOSCOPY     • KNEE SURGERY Left     knee scope janna on and standing up from a chair with arms (e.g., wheelchair, bedside commode, etc.): None   -   Moving from lying on back to sitting on the side of the bed?: None   How much help from another person does the patient currently need. ..   -   Moving to and fr addressed    ASSESSMENT   Patient is a 66year old female admitted on 7/23/2021 for acute renal insufficiency.   Pertinent comorbidities and personal factors impacting therapy include AAA, CHF, COPD, HTN, HLD, CAD, atrial fibrillation on eliquis, renal infa Goals established on 7/24/2021

## 2021-07-24 NOTE — PLAN OF CARE
Assumed care of the patient at 07:00 AM. Patient is alert and oriented x4. Bilateral breath sounds clear. 1 LPM NC (baseline). Denies HERRERA. Per patient she does have a slight smokers cough in the morning. NSR on tele currently (on eliquis for afib).  BLE pit and trends  - Administer ordered vasoactive medications  - Assess skin color and temperature  Outcome: Progressing  Goal: Absence of cardiac arrhythmias or at baseline  Description: INTERVENTIONS:  - Monitor vital signs, obtain 12 lead EKG if indicated  -

## 2021-07-24 NOTE — CONSULTS
[facility]  Consultation Note      Emelina Russell Patient Status:  Observation    1945 MRN [id number]   [facility] 8NE-A Attending Genia Snell MD   Hosp Day # 0 PCP Daly Marr DO     Reason for consult • Chronic back pain    • Congestive heart disease (HCC)    • COPD (chronic obstructive pulmonary disease) (HCC)    • [facility]    • High blood pressure    • Hyperlipidemia    • Hypertension    • Muscle weakness    • Neuropathy    • Personal hist negative for epistaxis  Respiratory: negative for dyspnea on exertion  Cardiovascular: negative for chest pain  Gastrointestinal: negative for melena  Genitourinary:negative for hematuria  Hematologic/lymphatic: negative for bleeding  Musculoskeletal:negat NS ST/T, LAFB. CXR:  FINDINGS:  Cardiomegaly.  Borderline pulmonary vascularity.  Tortuous and calcified aorta.  Stable elevation of right hemidiaphragm with right basilar atelectasis.  No new pulmonary opacity. TTE 5/26/21:   Conclusions:   1.  Lef a component of cardiorenal in setting decompensated CHF. Will monitor creatinine with diuresis. 3. Continue metoprolol  4. Has had previous extensive discussions regarding her complex CAD. Continue to manage medically for now. No angina.  Continue BB, zet

## 2021-07-24 NOTE — CONSULTS
Hematology/Oncology Consult Note        NAME: Lolly Dubose - ROOM: 1291/5312-O - MRN: [id number] - Age: 66year old - : 1945    Reason for Consult: thrombocytopenia    Patient is a 66 y.o female who presents on this admission with nausea and v Disorders Associated Brother     Other (cirrhosis) Brother     No Known Problems Brother     Heart Attack Sister     Hypertension Sister        SOCIAL HISTORY: Social History    Tobacco Use      Smoking status: Former Smoker        Packs/day: 0.50        Y 2.42* 2.28*   GFRAA 22* 23*   GFRNAA 19* 20*   CA 8.8 9.1   ALB 3.2*  --    * 130*   K 3.7 3.7   CL 94* 95*   CO2 24.0 24.0   ALKPHO 106  --    *  --    *  --    BILT 1.9  --    TP 7.5  --      Component      Latest Ref Rng & Units 7/24 Hematocrit      35.0 - 48.0 % 30.8 (L) 34.3 (L) 32.0 (L) 29.6 (L)   Platelet Count      554.8 - 450.0 10(3)uL 121.0 (L) 144.0 (L) 347.0 499.0 (H)   MCV      80.0 - 100.0 fL 91.7 91.0 95.0 91.9   MCH      26.0 - 34.0 pg 29.2 28.6 28.2 28.0   MCHC      [facility]. [facility]

## 2021-07-25 NOTE — PLAN OF CARE
Assumed care of pt at 1930 a/o x4 in no apparent distress. She was given her 2nd dose of lasix for the day. Edema BLE 2+ and she stated is unchanged as of admission yet. Monitor shows sinus rhythm with BP 99/60 and Toprol was held - lasix given instead. comfort measures as appropriate (emesis basin, reduce stimuli)  - Advance diet as tolerated  - Administer IV fluids as ordered to ensure adequate hydration  - Obtain nutritional consult as needed  - Maintain the patient in an upright position   - Evaluate

## 2021-07-25 NOTE — PROGRESS NOTES
[facility] Hospitalist Progress Note                                                                   1 Gato Kramer  7/26/1945    SUBJECTIVE: no chest pain, palpitations, shortness of breat COPD, HTN, HLD, CAD, atrial fibrillation on eliquis, renal infarcts, Neuropathy, RA, hx CVA and lung cancer s/p resection and chemo presenting with acute on chronic abdominal pain with nausea and vomiting as well as possible blood in her stool.     Abdomin up with her oncologist     Quality:  · DVT Prophylaxis: scd  · CODE status: Full per Chart  · Russo: none     Plan of care discussed with patient and staff     Dispo: no discharge     Maisha Valverde MD  [facility] Hospitalist  [phone]     Greater then 35 min

## 2021-07-25 NOTE — PROGRESS NOTES
[facility]    Nephrology Progress Note    Jamal Soriano Attending:  Breanna Chairez MD       SUBJECTIVE:   Geeta Baxter is feeling the same today. No change in legs swelling. Feels as though she is not emptying her bladder.      PHYSICAL EXA 130 (H) 07/25/2021    BILT 2.1 (H) 07/25/2021    TP 6.7 07/25/2021    ALB 3.1 (L) 07/25/2021    GLOBULIN 3.6 07/25/2021     (L) 07/25/2021    K 4.5 07/25/2021    CL 95 (L) 07/25/2021    CO2 28.0 07/25/2021     Lab Results   Component Value Date    WB TECHNIQUE:  Unenhanced multislice CT scanning was performed from the dome of the diaphragm to the pubic symphysis.  Dose reduction techniques were used.  Dose information is transmitted to the [facility] ([address] [place] [address] of Radiology) Prashanth Magaña [address] [place] [address] Radiology osseous injuries are noted. LUNG BASES:  No visible pulmonary or pleural disease.     OTHER:  Negative.                          Impression   CONCLUSION:     1.  There is colitis of the mid to distal transverse colon extending to the proximal descending c epithelial cells on UA. - Presenting creatinine 2.4 which improved to 2.2 following IV fluid administration in the ED. However Dee Dee Davidson then developed worsening congestive heart failure symptoms.  IV were stopped and IV lasix administered 7/24  - Creatinine up

## 2021-07-25 NOTE — PLAN OF CARE
Pt denies c/o pain, malaise, or cardiac symptoms. A&Ox4. Lungs clear bilaterally with equal expansion, on 2L O2 NC. Pt NSR on monitor with regular rate. Abdomen soft, round, and non-tender with active bowel sounds in all four quadrants.  Continent of B&B, d arrhythmias or at baseline  Description: INTERVENTIONS:  - Monitor vital signs, obtain 12 lead EKG if indicated  - Administer antiarrhythmic and heart rate control medications as ordered  - Initiate emergency measures for life threatening arrhythmias  - Mo

## 2021-07-25 NOTE — PROGRESS NOTES
Hematology/Oncology  Follow up note        NAME: Jose Alejandro Orozco - ROOM: 7047/9051-T - MRN: [id number] - Age: 66year old - : 1945    Subjective:  Platelets are stable     Past Medical History:   Diagnosis Date   • AAA (abdominal aortic aneurysm) Start date: 1963      Smokeless tobacco: Never Used      Tobacco comment: max 0.75 pk/dy, 0.5 pk/dy QUIT  SMOKEING 1 MONTH AGO    Alcohol use: Never    Medications:  • bumetanide  2 mg Intravenous BID (Diuretic)   • ezetimibe  10 mg Oral Daily   • folic ac 80.0 - 100.0 fL 94.9 96.3 94.9 94.1   MCH      26.0 - 34.0 pg 29.2 29.8 29.3 29.4   MCHC      31.0 - 37.0 g/dL 30.7 (L) 31.0 30.9 (L) 31.2   RDW      11.0 - 15.0 % 19.8 (H) 20.2 (H) 20.3 (H) 21.2 (H)   RDW-SD      35.1 - 46.3 fL 68.0 (H) 70.2 (H) 70.4 (H 1.00 x10(3) uL 0.82 1.11 (H)     Eosinophils Absolute      0.00 - 0.70 x10(3) uL 0.69 0.36     Basophils Absolute      0.00 - 0.20 x10(3) uL 0.05 0.29 (H)     Immature Granulocyte Absolute      0.00 - 1.00 x10(3) uL 0.03 0.07     Neutrophils %      % 61.7

## 2021-07-25 NOTE — PROGRESS NOTES
[facility]  Progress Note      Fei Mow Patient Status:  Observation    1945 MRN [id number]   [facility] 8NE-A Attending Raquel Adams MD   Hosp Day # 0 PCP Colten Roberts DO     Subjective:  She naziaie QAZOILA AC  [Held by provider] Enoxaparin Sodium (LOVENOX) 30 MG/0.3ML injection 30 mg, 30 mg, Subcutaneous, Daily  Rosuvastatin Calcium (CRESTOR) tab 10 mg, 10 mg, Oral, Nightly      Past Medical History:   Diagnosis Date   • AAA (abdominal aortic aneurysm) ( edema. Warm.    Psychiatric: Normal mood and affect; answers questions appropriately  Dermatologic: No rashes; normal skin turgor    Diagnostic testing:  Labs:   Lab Results   Component Value Date    INR 1.54 (H) 05/28/2021    INR 1.88 (H) 05/27/2021 Right ventricle: The cavity size was mildly decreased. 6. Pulmonary arteries: Systolic pressure was mildly increased, in the range      of 40mm Hg to 45mm Hg.      Impression:  Lindsay French is a 66year old female with a history of CAD, ischemic HFr

## 2021-07-26 PROBLEM — I50.43 ACUTE ON CHRONIC COMBINED SYSTOLIC AND DIASTOLIC CHF (CONGESTIVE HEART FAILURE) (HCC): Status: ACTIVE | Noted: 2021-01-01

## 2021-07-26 NOTE — PROGRESS NOTES
[facility]  Progress Note      Gabriella Bushra Patient Status:  Observation    1945 MRN [id number]   [facility] 8NE-A Attending Vito Reyes MD   Hosp Day # 0 PCP Jacky Zapien DO     Subjective:  Stable, n mg, Oral, QAM AC  [Held by provider] Enoxaparin Sodium (LOVENOX) 30 MG/0.3ML injection 30 mg, 30 mg, Subcutaneous, Daily  Rosuvastatin Calcium (CRESTOR) tab 10 mg, 10 mg, Oral, Nightly      Past Medical History:   Diagnosis Date   • AAA (abdominal aortic a tense LE edema. Warm.    Psychiatric: Normal mood and affect; answers questions appropriately  Dermatologic: No rashes; normal skin turgor    Diagnostic testing:  Labs:   Lab Results   Component Value Date    INR 1.54 (H) 05/28/2021    INR 1.88 (H) 05/27/20 mildly increased. 5. Right ventricle: The cavity size was mildly decreased. 6. Pulmonary arteries: Systolic pressure was mildly increased, in the range      of 40mm Hg to 45mm Hg.      Cath 5/28/21:  1.  Left heart catheterization: LVEDP 27mmHg, no aort

## 2021-07-26 NOTE — PROGRESS NOTES
[facility]  Progress Note      Brissa Serabad Patient Status:  Observation    1945 MRN [id number]   [facility] 8NE-A Attending Dave Zelaya MD   [facility] Day # 0 PCP Venu Hammond DO     Subjective:  No chest (Toprol XL) 24 hr tab 50 mg, 50 mg, Oral, BID  Pantoprazole Sodium (PROTONIX) EC tab 40 mg, 40 mg, Oral, QAM AC  [Held by provider] Enoxaparin Sodium (LOVENOX) 30 MG/0.3ML injection 30 mg, 30 mg, Subcutaneous, Daily  Rosuvastatin Calcium (CRESTOR) tab 10 m noted  Abdomen: Soft, non-tender; bowel sounds are normoactive; no hepatosplenomegaly  Extremities: trace b/l LE edema. Warm.    Psychiatric: Normal mood and affect; answers questions appropriately  Dermatologic: No rashes; normal skin turgor    Diagnostic evidence for stenosis. There was trivial      regurgitation. 4. Left atrium: The left atrial volume was mildly increased. 5. Right ventricle: The cavity size was mildly decreased.    6. Pulmonary arteries: Systolic pressure was mildly increased, in the 5.2 cm infrarenal AAA. There is no evidence of dissection on the ultrasound, CT was w/o IV contrast. Monitor closely for now and consider CTA if persistent/progressive symptoms. 10. Cardiology to follow.

## 2021-07-26 NOTE — PLAN OF CARE
Pt denies c/o pain, malaise, or cardiac symptoms. A&Ox4. Lungs clear bilaterally with equal expansion, on room air. Pt NSR on monitor with regular rate. Abdomen soft, round, and non-tender with active bowel sounds in all four quadrants.  Continent of bowel, arrhythmias or at baseline  Description: INTERVENTIONS:  - Monitor vital signs, obtain 12 lead EKG if indicated  - Administer antiarrhythmic and heart rate control medications as ordered  - Initiate emergency measures for life threatening arrhythmias  - Mo

## 2021-07-26 NOTE — PLAN OF CARE
Received pt at 1930. Alert and oriented, no complaints of pain. NSR on tele with PACs. Satting 94-95% on 1-2L O2 nc overnight, .  2+ edema to BLE, 3+ around left ankle. Russo in place, voiding yellow/straw/clear output.   PRN restoril given for sleep

## 2021-07-26 NOTE — PROGRESS NOTES
[facility] Hospitalist Progress Note                                                                   1 Gato Kramer  7/26/1945    INTERVAL HISTORY/SUBJECTIVE:     DANK Overnight  Afebrile, H Nightly     Continuous Infusions:   PRN: melatonin, temazepam, Dicyclomine HCl, acetaminophen, HYDROcodone-acetaminophen    ASSESSMENT / PLAN:   66year old female with history of AAA, CHF, COPD, HTN, HLD, CAD, atrial fibrillation on eliquis, renal infarct hx  -pantoprazole     RA  -norco po prn     COPD  -chronic 1 liter o2     HTN  -BP stable  -metoprolol     Lung Cancer  -s/p resection and chemo  -no further treatment per pt, last treatment was in June  -outpt follow up with her oncologist     Quality:  ·

## 2021-07-26 NOTE — PHYSICAL THERAPY NOTE
PHYSICAL THERAPY TREATMENT NOTE - INPATIENT      Room Number: 3659/7242-G     Session: 1  Number of Visits to Meet Established Goals: 3    Presenting Problem: acute renal insufficiency    Problem List  Principal Problem:    Acute renal insufficiency  Activ '6-Clicks' INPATIENT SHORT FORM - BASIC MOBILITY  How much difficulty does the patient currently have. ..  -   Turning over in bed (including adjusting bedclothes, sheets and blankets)?: None   -   Sitting down on and standing up from a chair with arms (e.g assist device: walker - rolling at assistance level: supervision      Goal #4     Goal #5     Goal #6     Goal Comments: Goals established on 7/24/2021 none

## 2021-07-26 NOTE — PROGRESS NOTES
[facility]    Nephrology Progress Note    Paul Paul Attending:  Estee Lewis MD       SUBJECTIVE:     She is disappointed she is in the hospital on her birthday  No shortness of breath  Still with leg swelling  Russo placed yesterday    PH 0.86 (L) 06/07/2021    EOSABS 0.00 06/07/2021    BASABS 0.00 06/07/2021    NEUT 77 06/07/2021    LYMPH 7 06/07/2021    MON 16 06/07/2021    EOS 0 06/07/2021    BASO 0 06/07/2021    NEPERCENT 70.7 07/26/2021    LYPERCENT 19.8 07/26/2021    MOPERCENT 7.5 07/ tab 40 mg, 40 mg, Oral, QAM AC  [Held by provider] Enoxaparin Sodium (LOVENOX) 30 MG/0.3ML injection 30 mg, 30 mg, Subcutaneous, Daily        ASSESSMENT/PLAN:     65 yo F with history of CKD stage 3 with baseline creatinine ~1.2- 1.3 mg/dl, HTN, HL, lung c

## 2021-07-26 NOTE — OCCUPATIONAL THERAPY NOTE
OCCUPATIONAL THERAPY EVALUATION - INPATIENT     Room Number: 1934/2451-H  Evaluation Date: 7/26/2021  Type of Evaluation: Initial  Presenting Problem: Acute renal insufficiency    Physician Order: IP Consult to Occupational Therapy  Reason for Therapy: ADL Surgical History:   Procedure Laterality Date   • BACK SURGERY      lower back   •       x2   • COLONOSCOPY N/A 2021    Procedure: COLONOSCOPY;  Surgeon: Elizabeth Barker MD;  Location: [facility] ENDOSCOPY   • COLONOSCOPY     • KNEE SURGERY Left ‘6-Clicks’ Inpatient Daily Activity Short Form  How much help from another person does the patient currently need…  -   Putting on and taking off regular lower body clothing?: A Little  -   Bathing (including washing, rinsing, drying)?: A Little  -   Toile ADL indicates that pt is a good home health PT/OT candidate.     In this OT evaluation patient presents with the following performance deficits: decreased ADL/IADL independence, decreased activity tolerance/endurance, decreased mobility/transfers, increased Goal  Patient will be independent with bilateral AROM HEP (home exercise program).

## 2021-07-26 NOTE — PROGRESS NOTES
[facility]  Progress Note    Annamarie Billings Patient Status:  Observation    1945 MRN [id number]   [facility] 8NE-A Attending Thu Camejo MD   [facility] Day # 0 PCP Jyl Jeans, DO     ADMISSION DATE AND TIME: 2021 10:36 g/dL    HCT 30.7 (L) 35.0 - 48.0 %    PLT 36.0 (L) 150.0 - 450.0 10(3)uL    MCV 95.9 80.0 - 100.0 fL    MCH 29.1 26.0 - 34.0 pg    MCHC 30.3 (L) 31.0 - 37.0 g/dL    RDW 19.7 (H) 11.0 - 15.0 %    RDW-SD 69.5 (H) 35.1 - 46.3 fL    Neutrophil Absolute Prelim ABDOMEN+PELVIS(CPT=74176)    Result Date: 7/24/2021  CONCLUSION:  1. There is colitis of the mid to distal transverse colon extending to the proximal descending colon which is similar to slightly increased from prior imaging.  2. Mild ascites in the supra h MD LUZMARIA on 7/23/2021 at 11:15 PM       XR CHEST AP PORTABLE  (CPT=71045)    Result Date: 7/4/2021  CONCLUSION:  Findings are suggestive of pulmonary vascular congestion. Bibasilar opacities may be related to fluid overload.   Overlapping infection cannot

## 2021-07-26 NOTE — CONSULTS
Jeanette Jones MD    Department of Gastroenterology  [facility] Patient Status:  Observation    1945 MRN [id number]   [facility] 8NE-A Attending Pau Reid MD   [facility] Day Moon Wright, DO      Reason for Consultation:  Anemia  melena     Sayra Lovell is a a(n) 66year old female. Daughter, Fariha Turcios, is a RN     Lung ca, recent renal infarct and started on eliquis last week hospital admit.   Getting chemo, has thrombocytopenia informed consent was obtained.      The videogastroscope was intubated into the esophagus and advanced into the duodenum under directed vision without difficulty. Then withdrawn.     The patient was repositioned and prepared for the colonoscopy.   The scope and upon completion and throughtout the vital signs were stable.        Specimens:  See above        Condition on discharge from procedure:  good        PLAN:     Unclear source of bleeding.   AVM in bulb was small and non bleeding and gastric findings may gallbladder sludge without definite gallstones.  The common bile duct measures 3.7 mm.  No intrahepatic biliary tree dilation.    PANCREAS:  The body and the tail of the pancreas are obscured by bowel gas.  The visualized head of the pancreas is unremarkabl Technologist)  Nausea, vomiting up white foam.            FINDINGS:     LIVER:  There is hepatomegaly measuring 24.5 cm in craniocaudal dimension.  No focal hepatic lesions.    BILIARY:  No visible dilatation or calcification.     PANCREAS:  No lesion, flui increased from prior imaging. 3. Stable low-density left adrenal gland nodule, most consistent with a benign adenoma. 4. Nonobstructing bilateral nephrolithiasis.    5. Stable infrarenal abdominal aortic aneurysm with diffuse atherosclerosis of the aort Known Problems Daughter    • No Known Problems Daughter    • Alcohol and Other Disorders Associated Brother    • Other (cirrhosis) Brother    • No Known Problems Brother    • Heart Attack Sister    • Hypertension Sister       reports that she has quit smok chronic/frequent hoarseness, wheezing, chronic cough, cough up sputum. Genitourinary: Denies kidney stones, painful/difficult urination, frequent urinary infections, frequent urination, blood in urine, incontinence, kidney failure.   Psychosocial: noncontr 07/26/2021    TP 6.6 07/26/2021     07/26/2021     07/26/2021    MG 2.2 07/26/2021         Assessment:     Constipation  Nausea and vomiting  Lower abdominal pain  Rectal bleeding  Abnormal transaminases   Abdominal pain may related to consti

## 2021-07-27 PROBLEM — N18.30 CKD (CHRONIC KIDNEY DISEASE) STAGE 3, GFR 30-59 ML/MIN (HCC): Status: ACTIVE | Noted: 2021-01-01

## 2021-07-27 NOTE — PROGRESS NOTES
[facility]    Progress Note    Paul Paul Patient Status:  Inpatient    1945 MRN [id number]   [facility] 8NE-A Attending Estee Lewis MD   Hosp Day # 1 PCP Edel Rose, DO     HPI/Subjective:   Paul Paul is calcifications without definite hydronephrosis. Renal cortical atrophy right greater than left. 3. Stable 4.6 x 4.7 cm fusiform infrarenal abdominal aortic aneurysm.      Dictated by (CST): Forrest Denver, MD on 7/25/2021 at 6:02 PM     Finalized by (CS

## 2021-07-27 NOTE — PLAN OF CARE
Received pt at 1930. Alert and oriented. NSR on tele. Lung sounds diminished. Albumin infusing per orders. Voiding clear yellow to ojeda. BM tonight, WNL. PRN restoril given for sleep. Fall precautions in place. Patient resting comfortably.   Will 12 lead EKG if indicated  - Administer antiarrhythmic and heart rate control medications as ordered  - Initiate emergency measures for life threatening arrhythmias  - Monitor electrolytes and administer replacement therapy as ordered  Outcome: Progressing

## 2021-07-27 NOTE — CONSULTS
[facility]  Report of Consultation    Roselyn Rendon Patient Status:  Observation    1945 MRN [id number]   [facility] 8NE-A Attending Haven Miller MD   Hosp Day # 0 PCP Sharifa Asif DO     Reason for Consultation:  Jeffrey Hurley Brother    • No Known Problems Brother    • Heart Attack Sister    • Hypertension Sister       reports that she has quit smoking. Her smoking use included cigarettes. She started smoking about 58 years ago. She has a 28.50 pack-year smoking history.  She ha positive for dyspnea  Gastrointestinal: positive for constipation   : retention    Physical Exam:  /62 (BP Location: Left arm)   Pulse 77   Temp 98.1 °F (36.7 °C) (Oral)   Resp 18   Ht 5' 3\" (1.6 m)   Wt 194 lb 1.6 oz (88 kg)   SpO2 95%   BMI 34. 3 compression fracture of L2 vertebra, initial encounter (HCC)     Thrombocytopenia ([facility]     Acute kidney injury ([facility]     Gastrointestinal hemorrhage     Gastrointestinal hemorrhage, unspecified gastrointestinal hemorrhage type     Anemia, unspecified type

## 2021-07-27 NOTE — PLAN OF CARE
Assumed care of the patient at 07:00 AM. Patient is alert and oriented x4. Bilateral breath sounds clear. Denies cough but states she has a morning smoker's cough. Room air. NSR on tele. BLE pitting edema +2. Patient had a bowel movement today.  Karan dao Progressing  Goal: Absence of cardiac arrhythmias or at baseline  Description: INTERVENTIONS:  - Monitor vital signs, obtain 12 lead EKG if indicated  - Administer antiarrhythmic and heart rate control medications as ordered  - Initiate emergency measures strategies to promote bladder emptying  Outcome: Not Progressing

## 2021-07-27 NOTE — CARDIAC REHAB
Pt received phone call just as I entered room to attempt HF education, wanted me to come back. Left binder at bedside. When I returned just now, pt in bed. Will continue to follow.

## 2021-07-27 NOTE — PROGRESS NOTES
[facility] Hospitalist Progress Note                                                                   1 Gato Kramer  7/26/1945    INTERVAL HISTORY/SUBJECTIVE:     DANK Overnight  Afebrile, H 25 g Intravenous Q8H   • ezetimibe  10 mg Oral Nightly   • rosuvastatin  10 mg Oral Nightly   • PEG 3350  17 g Oral BID   • glycerin (laxative)  1 suppository Rectal Once   • bumetanide  2 mg Intravenous BID (Diuretic)   • folic acid  2 mg Oral Daily   • m the ER, stopped 7/25  -LE edema worse at home  -cardiology following --> lasix iv bid  -unchanged fluid status, may need to increased lasix, follow renal/cards rec today      HLD  -zetia  -rosuvastatin      CAD hx  -medical management  -metoprolol  -rosuva

## 2021-07-27 NOTE — PROGRESS NOTES
[facility]    Nephrology Progress Note    Rockwall Christian Attending:  Norm Graves MD       SUBJECTIVE:     Low urine output, low blood pressure and not much change in swelling despite albumin/bumex    Denies n/v/abdominal pain    Tolerating fol (H) 06/07/2021    LYMPHABS 0.86 (L) 06/07/2021    EOSABS 0.00 06/07/2021    BASABS 0.00 06/07/2021    NEUT 77 06/07/2021    LYMPH 7 06/07/2021    MON 16 06/07/2021    EOS 0 06/07/2021    BASO 0 06/07/2021    NEPERCENT 70.7 07/26/2021    LYPERCENT 19.8 07/2 MG per tab 1 tablet, 1 tablet, Oral, Q8H PRN  metoprolol succinate (Toprol XL) 24 hr tab 50 mg, 50 mg, Oral, BID  Pantoprazole Sodium (PROTONIX) EC tab 40 mg, 40 mg, Oral, QAM AC  [Held by provider] Enoxaparin Sodium (LOVENOX) 30 MG/0.3ML injection 30 mg,

## 2021-07-27 NOTE — PROGRESS NOTES
[facility]  Progress Note      Tanvir Broussard Patient Status:  Observation    1945 MRN [id number]   [facility] 8NE-A Attending Suzette Ferguson MD   [address] Day # 1 PCP Ben Chung DO     Subjective:  Stable, n Daily  HYDROcodone-acetaminophen (NORCO) 5-325 MG per tab 1 tablet, 1 tablet, Oral, Q8H PRN  metoprolol succinate (Toprol XL) 24 hr tab 50 mg, 50 mg, Oral, BID  Pantoprazole Sodium (PROTONIX) EC tab 40 mg, 40 mg, Oral, QAM AC  [Held by provider] Enoxaparin auscultation bilaterally; no accessory muscle use is noted  Abdomen: Soft, non-tender; bowel sounds are normoactive; no hepatosplenomegaly  Extremities: 1+ LE edema. Warm.    Psychiatric: Normal mood and affect; answers questions appropriately  Dermatologic evidence for stenosis. There was trivial      regurgitation. 4. Left atrium: The left atrial volume was mildly increased. 5. Right ventricle: The cavity size was mildly decreased.    6. Pulmonary arteries: Systolic pressure was mildly increased, in the LFTs decreasing. 6. Has previously seen Dr. Trini Starr and not an ICD candidate at this time. 7. Holding apixaban and aspirin in setting of rectal bleeding and anemia. Hematology following, IV Venofer today.

## 2021-07-27 NOTE — HOME CARE LIAISON
This is a current patient with [facility] and will need a VIDA order on or before the day of DC.  Services:RN/PT/OT

## 2021-07-27 NOTE — PROGRESS NOTES
[facility]  Progress Note    Woodbridge Ours Patient Status:  Inpatient    1945 MRN [id number]   [facility] 8NE-A Attending Tommy Muse MD   [facility] Day # 1 PCP Sharon Proper, DO     Subjective:  No further GI symptoms.   Jaime history of antineoplastic chemotherapy     had  to stop  chemo r/t side effects   • Pneumonia due to organism    • Renal failure    • Rheumatoid arthritis ([facility]    • Stroke [facility]) 2010    RUE/RLE numbness   • Visual impairment     readers     Past Surgical (L)   Transferrin      200 - 360 mg/dL 288   Iron Bind. Cap.(TIBC)      240 - 450 ug/dL 429   Iron Saturation      15 - 50 % 7 (L)   FERRITIN      18.0 - 340.0 ng/mL 104.3       Assessment:     Constipation  Nausea and vomiting  Lower abdominal pain  Rectal

## 2021-07-27 NOTE — PROGRESS NOTES
[facility]  Progress Note    Gin Powell Patient Status:  Inpatient    1945 MRN [id number]   [facility] 8NE-A Attending Julian Hernandez MD   [facility] Day # 1 SARI Sewell DO     ADMISSION DATE AND TIME: 2021 10:36 P Nonreactive  Nonreactive    Renal Function Panel    Collection Time: 07/27/21  5:12 AM   Result Value Ref Range    Glucose 108 (H) 70 - 99 mg/dL    Sodium 134 (L) 136 - 145 mmol/L    Potassium 4.1 3.5 - 5.1 mmol/L    Chloride 98 98 - 112 mmol/L    CO2 27. 0 follow.  Julio Cesar Powell MD

## 2021-07-27 NOTE — DIETARY NOTE
[facility]   CLINICAL NUTRITION    Brian Navjot Stoutmary     Admitting diagnosis:  Abdominal pain, generalized [R10.84]  Hyponatremia [E87.1]  Acute renal insufficiency [N28.9]    Ht: 160 cm (5' 3\")  Wt: 89.8 kg (197 lb 14.4 oz).    Body mass index is 35

## 2021-07-28 NOTE — PROGRESS NOTES
[facility] Hospitalist Progress Note                                                                   1 Gato Kramer  7/26/1945    INTERVAL HISTORY/SUBJECTIVE:      700 ml out the last 24 h. • glycerin (laxative)  1 suppository Rectal Once   • folic acid  2 mg Oral Daily   • metoprolol succinate  50 mg Oral BID   • pantoprazole  40 mg Oral QAM AC   • [Held by provider] enoxaparin  30 mg Subcutaneous Daily     Continuous Infusions:   PRN: alec management  -metoprolol  -rosuvastatin   -asa on hold     Atrial Fibrillation hx  -hx renal infarcts and cva  -metoprolol  -eliquis on hold due to low plt and gi bleed     GI Bleed hx  -pantoprazole     RA  -norco po prn     COPD  -on room air at rest

## 2021-07-28 NOTE — PROGRESS NOTES
[facility]  Progress Note    Roselyn Rendon Patient Status:  Inpatient    1945 MRN [id number]   [facility] 8NE-A Attending Manuel Smallwood MD   [facility] Day # 2 PCP Sharifa Asif DO     Subjective:  Some vomiting this afte weakness    • Neuropathy    • Personal history of antineoplastic chemotherapy     had  to stop  chemo r/t side effects   • Pneumonia due to organism    • Renal failure    • [facility]    • Stroke ([facility] 2010    RUE/RLE numbness   • Visual im pain  Rectal bleeding  Abnormal transaminases   Had some nausea and vomiting later today. Several bms with miralax.                  Liver work up thus far negative                   Abdominal pain may related to constipation with straining an

## 2021-07-28 NOTE — PLAN OF CARE
Received pt at 1930. A&O. NSR on tele. On RA. No complaints of pain. Russo draining clear yellow urine. Awaiting echo results. Last BM today. Up with 1 bayron. Pt updated on poc and resting comfortably in bed.    Problem: Patient/Family Goals  Goal: Patien rate control medications as ordered  - Initiate emergency measures for life threatening arrhythmias  - Monitor electrolytes and administer replacement therapy as ordered  Outcome: Progressing     Problem: Gastrointestinal  Goal: Minimal or absence of nause

## 2021-07-28 NOTE — PROGRESS NOTES
[facility] Cardiology  Progress Note      Lindbergh Silence Patient Status:  Observation    1945 MRN [id number]   [facility] 8NE-A Attending Kim Heck MD   [facility] Day # 2 PCP Sourav Power DO     Subjective: Feels more tablet, 1 tablet, Oral, Q8H PRN  metoprolol succinate (Toprol XL) 24 hr tab 50 mg, 50 mg, Oral, BID  Pantoprazole Sodium (PROTONIX) EC tab 40 mg, 40 mg, Oral, QAM AC  [Held by provider] Enoxaparin Sodium (LOVENOX) 30 MG/0.3ML injection 30 mg, 30 mg, Subcut Soft, non-tender; bowel sounds are normoactive; no hepatosplenomegaly  Extremities: 2+ LE edema.  Cool   Psychiatric: Normal mood and affect; answers questions appropriately  Dermatologic: No rashes; normal skin turgor    Diagnostic testing:  Labs:   Lab Re of the mid and distal anterior myocardium. Hypokinesis of the      mid and distal lateral myocardium. Hypokinesis of the mid and distal      anteroseptal myocardium.  Hypokinesis of the apical myocardium.      Hypokinesis of the mid and distal inferior myoc Thrombocytopenia    Recommendations:    1. Will try low dose dobutamine to improve renal perfusion. This is not without risk given unrevascularized CAD. Monitor closely to avoid cardiac ischemia.  No other good options, alternative is renal replacement ther

## 2021-07-28 NOTE — CM/SW NOTE
07/28/21 1600   CM/SW Referral Data   Referral Source Social Work (self-referral)   Reason for Referral Discharge planning   Informant Patient   Patient Info   Patient's Mental Status Alert;Oriented   Patient's Home Environment Senior Independent Housin July 19, 2020        To the Parent (s) of:  Karma Han  [address] [facility] [id number]    This letter provides a written record that you were tested for COVID-19 on 07/14/2020.       Your result was negative. This means that we didn t find the virus that causes COVID-19 in your sample. A test may show negative when you do actually have the virus. This can happen when the virus is in the early stages of infection, before you feel illness symptoms.    If you have symptoms   Stay home and away from others (self-isolate) until you meet ALL of the guidelines below:    You ve had no fever--and no medicine that reduces fever--for 3 full days (72 hours). And      Your other symptoms have gotten better. For example, your cough or breathing has improved. And     At least 10 days have passed since your symptoms started.    During this time:    Stay home. Don t go to work, school or anywhere else.     Stay in your own room, including for meals. Use your own bathroom if you can.    Stay away from others in your home. No hugging, kissing or shaking hands. No visitors.    Clean  high touch  surfaces often (doorknobs, counters, handles, etc.). Use a household cleaning spray or wipes. You can find a full list on the EPA website at www.epa.gov/pesticide-registration/list-n-disinfectants-use-against-sars-cov-2.    Cover your mouth and nose with a mask, tissue or washcloth to avoid spreading germs.    Wash your hands and face often with soap and water.    Going back to work  Check with your employer for any guidelines to follow for going back to work.    Employers: This document serves as formal notice that your employee tested negative for COVID-19, as of the testing date shown above.

## 2021-07-28 NOTE — PROGRESS NOTES
[facility]    Nephrology Progress Note    Aureliano Ronquillo Attending:  Larry Gunter MD       SUBJECTIVE:     No shortness of breath  Still with leg swelling  Creatinine increased despite holding diuretics, giving albumin and starting midodr 9.26 (H) 06/07/2021    LYMPHABS 0.86 (L) 06/07/2021    EOSABS 0.00 06/07/2021    BASABS 0.00 06/07/2021    NEUT 77 06/07/2021    LYMPH 7 06/07/2021    MON 16 06/07/2021    EOS 0 06/07/2021    BASO 0 06/07/2021    NEPERCENT 70.7 07/26/2021    LYPERCENT 19.8 Sodium (PROTONIX) EC tab 40 mg, 40 mg, Oral, QAM AC  [Held by provider] Enoxaparin Sodium (LOVENOX) 30 MG/0.3ML injection 30 mg, 30 mg, Subcutaneous, Daily        ASSESSMENT/PLAN:        65 yo F with history of CKD stage 3 with baseline creatinine ~1.2- 1.

## 2021-07-28 NOTE — PROGRESS NOTES
[facility]  Progress Note    Skinny Adair Patient Status:  Inpatient    1945 MRN [id number]   [facility] 8NE-A Attending Genesis Toure MD   [facility] Day # 2 PCP Fouzia Ba DO     ADMISSION DATE AND TIME: 2021 10:36 P - 100.0 fL    MCH 29.8 26.0 - 34.0 pg    MCHC 32.4 31.0 - 37.0 g/dL    RDW 19.8 %    Neutrophil Absolute Prelim 4.48 1.50 - 7.70 x10 (3) uL    Neutrophil Absolute 4.48 1.50 - 7.70 x10(3) uL    Lymphocyte Absolute 1.11 1.00 - 4.00 x10(3) uL    Monocyte Abso

## 2021-07-28 NOTE — PLAN OF CARE
Pt denies c/o pain, malaise, or cardiac symptoms. A&Ox4. Lungs clear bilaterally with equal expansion, on room air. Pt NSR on monitor with regular rate. Abdomen soft and non-tender with active bowel sounds in all four quadrants.  Continent of bowels, ojeda cardiac arrhythmias or at baseline  Description: INTERVENTIONS:  - Monitor vital signs, obtain 12 lead EKG if indicated  - Administer antiarrhythmic and heart rate control medications as ordered  - Initiate emergency measures for life threatening arrhythmi

## 2021-07-28 NOTE — PHYSICAL THERAPY NOTE
PHYSICAL THERAPY TREATMENT NOTE - INPATIENT    Room Number: 0416/9982-O     Session: 2  Number of Visits to Meet Established Goals: 3    Presenting Problem: acute renal insufficiency    History related to current admission: Pt admitted from ED on 7/23/21 lower back   •       x2   • COLONOSCOPY N/A 2021    Procedure: COLONOSCOPY;  Surgeon: Maxwell Camargo MD;  Location: [address] [place] [address] ENDOSCOPY   • COLONOSCOPY     • KNEE SURGERY Left     knee scope cleaned up   • OTHER SURGICAL HISTORY Bilateral     bi tested       Skilled Therapy Provided: Pt sitting in chair at start of session and willing to try ambulating this session. Pt sit>stand with RW and supervision. Pt ambulated 15ft with RW and CGA, required short standing rest break to catch breath.  Pt retur Goals established on 7/24/2021

## 2021-07-29 NOTE — PROGRESS NOTES
[facility]    Nephrology Progress Note    Kristen Wright Attending:  Gianna La MD       SUBJECTIVE:     Low urine output noted despite starting bumex and dobutamine gtt  Patient still complains of swelling  Not much change in breathing  Crea 9.26 (H) 06/07/2021    LYMPHABS 0.86 (L) 06/07/2021    EOSABS 0.00 06/07/2021    BASABS 0.00 06/07/2021    NEUT 77 06/07/2021    LYMPH 7 06/07/2021    MON 16 06/07/2021    EOS 0 06/07/2021    BASO 0 06/07/2021    NEPERCENT 76.2 07/29/2021    LYPERCENT 13. 9 EXTRA STRENGTH) tab 500 mg, 500 mg, Oral, E6B PRN  folic acid (FOLVITE) tab 2 mg, 2 mg, Oral, Daily  HYDROcodone-acetaminophen (NORCO) 5-325 MG per tab 1 tablet, 1 tablet, Oral, Q8H PRN  [Held by provider] metoprolol succinate (Toprol XL) 24 hr tab 50 mg, advanced CHF consult? Discussed with nursing and Dr. Ninoska Sandy. Also discussed with daughter, Dominique Kang, on the phone. Prolonged time spent > 45 min. Thank you for allowing me to participate in the care of this patient.  Please do not hesitate to call w

## 2021-07-29 NOTE — PROGRESS NOTES
Assumed care at 2000. Patient laying in bed, denies CP, SOB and dizziness. dobs gtt infusing per order. Bumex gtt infusing per order. Russo secure and intact and draining. Patient AOx4; NSR on cardiac monitor.  Call light with in reach, will continue to mon

## 2021-07-29 NOTE — PROGRESS NOTES
[facility]  Progress Note      Tanvir Donna Patient Status:  Observation    1945 MRN [id number]   [facility] 8NE-A Attending Suzette Ferguson MD   [facility] Day # 3 PCP Ben Chung DO     Subjective: Stable mil Oral, QID PRN  acetaminophen (TYLENOL EXTRA STRENGTH) tab 500 mg, 500 mg, Oral, Y1G PRN  folic acid (FOLVITE) tab 2 mg, 2 mg, Oral, Daily  HYDROcodone-acetaminophen (NORCO) 5-325 MG per tab 1 tablet, 1 tablet, Oral, Q8H PRN  [Held by provider] metoprolol s atrauamatic; no thyromegaly  Neck: JVP >10 cm  Cardiac: Regular rate and regular rhythm; soft early systolic murmur at the base.    Lungs: Clear to auscultation bilaterally; no accessory muscle use is noted  Abdomen: Soft, non-tender; bowel sounds are normo blunted,      which may suggest elevated right atrium pressure from volume overload. TTE 5/26/21:   Conclusions:   1. Left ventricle: The cavity size was normal. Wall thickness was normal.      Systolic function was reduced.  The estimated ejection frac Echo with high filling pressures  2. Multivessel CAD - medically managed  3. Paroxysmal Atrial Fibrillation  4. H/o CVA  5. Lung CA stage 2B s/p resection and chemo  6. Rectal Bleeding  7. JASON with hyponatremia - cardiorenal with volume overload  8.  Deanna Jose

## 2021-07-29 NOTE — PROGRESS NOTES
[facility] Hospitalist Progress Note                                                                   1 Gato Kramer  7/26/1945    INTERVAL HISTORY/SUBJECTIVE:   No chest pain, palpitations, 5 mg Oral TID   • ezetimibe  10 mg Oral Nightly   • PEG 3350  17 g Oral BID   • glycerin (laxative)  1 suppository Rectal Once   • folic acid  2 mg Oral Daily   • [Held by provider] metoprolol succinate  50 mg Oral BID   • pantoprazole  40 mg Oral QA AC on hold  -resumed diet, monitor for reoccurence     Acute on Chronic Systolic HF  -I/o  -pt with maria m as well, given IVF in the ER, stopped 7/25  -difficult to determine volume status  -pt on dobutamine drip and iv bumex without significant improvement, car

## 2021-07-29 NOTE — PLAN OF CARE
Assumed care of patient at [phone]. Alert and oriented x4. Vital signs stable, NSR on telemetry. On room air, dyspnea noted with exertion. Patient denies pain. Had BM 7/28 via commode. Dobutamine gtt infusing per orders. Bumex gtt infusing per orders.  Karan parsons baseline  Description: INTERVENTIONS:  - Monitor vital signs, obtain 12 lead EKG if indicated  - Administer antiarrhythmic and heart rate control medications as ordered  - Initiate emergency measures for life threatening arrhythmias  - Monitor electrolytes

## 2021-07-29 NOTE — PLAN OF CARE
Tired today,refused to ambulate in the halls .   Denies any pain, slight sob with activity  Room air sats 94%  Output low, ojeda intact,accurate I/O  Continue Bumex gtt ,rate increase to 0.5mg at 4cc/hr  Diuril 250mg IV given,+2 edema lower extremities   Progressing     Problem: Gastrointestinal  Goal: Minimal or absence of nausea and/or vomiting  Description: INTERVENTIONS AS NEEDED:  - Assess bowel function  - Administer ordered antiemetic medications as needed: see electronic medical record  - Provide n

## 2021-07-30 NOTE — PHYSICAL THERAPY NOTE
Attempted to see pt for PT this am, pt was watching TV upon arrival. Pt refused PT stating \" I am not getting up, I want to sleep. \" Pt not in agreement to try therapy right now and sleep later. Encouragement provided.      Addendum: Re attempted this afte

## 2021-07-30 NOTE — PLAN OF CARE
Assumed care at 1900, pt resting in bed  A&Ox4  O2 sat WNL on RA-sating 95%  NSR on tele. Lovenox on hold d/t plt count. Toprol on hold per Dr. Danny Renae  bumex gtt infusing per order. Dobutamine gtt infusing per order. Miralax given. Last BM 7/29.  Karan in pl Administer ordered vasoactive medications  - Assess skin color and temperature  Outcome: Progressing  Goal: Absence of cardiac arrhythmias or at baseline  Description: INTERVENTIONS:  - Monitor vital signs, obtain 12 lead EKG if indicated  - Administer ant protocol/standard of care  - Consider collaborating with pharmacy to review patient's medication profile  - Implement strategies to promote bladder emptying  Outcome: Progressing

## 2021-07-30 NOTE — PLAN OF CARE
Assumed care this afternoon. Pt with very complex medical history. Remains on Bumex and Dobutamine gtt infusing as ordered. Russo draining yellow, somewhat cloudy urine but good UO today. Mepilex to sacral area C/D/I. Incontinent of loose stool.   Refus stability  Description: INTERVENTIONS:  - Monitor vital signs and trends  - Administer ordered vasoactive medications  - Assess skin color and temperature  Outcome: Progressing  Goal: Absence of cardiac arrhythmias or at baseline  Description: INTERVENTION medication profile  - Implement strategies to promote bladder emptying  Outcome: Progressing

## 2021-07-30 NOTE — CONSULTS
[address] [facility]  [id number]  Hospital Day #4  Date of Consult:   7/29/2021        Reason for Consultation:      Consult requested by Dr. Zeynep Mercedes for evaluation of palliative care needs, goals o ([facility]     NSCLC, treatment at [address] [place] [address]   • Chronic back pain    • Congestive heart disease ([facility]    • COPD (chronic obstructive pulmonary disease) ([facility]    • Heart attack ([facility]    • High blood pressure    • Hyperlipidemia    • Hypertension    • Muscle Q4H PRN  •  ezetimibe (ZETIA) tab 10 mg, 10 mg, Oral, Nightly  •  PEG 3350 (MIRALAX) powder packet 17 g, 17 g, Oral, BID  •  glycerin (laxative) adult rectal suppository 1 suppository, 1 suppository, Rectal, Once  •  melatonin cap/tab 5 mg, 5 mg, Oral, Nig 07/26/21  1044   CIERA 104.3       Imaging:  CARD ECHO 2D DOPPLER CONTRAST (CPT=93306)                                                     *[address]* estimated ejection fraction     was 20-25%. Yadiel-septal and anterior dyskinesis. All other myocardial     segments are hypokinetic. Grade 2 diastolic dysfunction. 2. Right ventricle: The cavity size was mildly increased.  Systolic function     was mildly the normal range. There  was no evidence for stenosis. There was moderate regurgitation. Pulmonic valve:   Not well visualized. Structurally normal valve. Cusp  separation was normal.  Doppler:  Transvalvular velocity was within the  normal range.  Ther ES, 1-p A4C                  (H)     61    ml     22 - 52   LA volume/bsa, ES, 1-p A4C                      32    ml/m^2 ---------   LA volume, ES, 1-p A2C                  (H)     114   ml     22 - 52   LA volume/bsa, ES, 1-p A2C                      59 person, place   Psychiatric: Mood - cooperative      Palliative Performance Scale: 35    Palliative Performance Scale   % Ambulation Activity Level Self-Care Intake Consciousness   100 Full Normal Full   Normal Full   90 Full No disease  Normal Full Normal had discussed yesterday. I offered to assist in the transition to meet with hospice so they are informed how she can be cared for going forward. Adeel Patel  is open to meeting with hospice. I complemented Adeel Patel for accepting her mothers wishes.  She would li breathing tube will be placed for respiratory distress or if no spontaneous breaths, \" allow natural death. \"      Comfort care is appropriate when further readmissions, aggressive treatment and evaluation of potential new symptoms do not benefit the julia counseling and coordinating care. We will continue to follow.     [address]  Phone [phone]  7/30/2021, 10:44 AM

## 2021-07-30 NOTE — PROGRESS NOTES
[facility] [address] [place] [address] Cardiology  Progress Note      [facility] Patient Status:  Observation    1945 MRN [id number]   [facility] 8NE-A Attending Lisa Sosa MD   [facility] Day # 4 PCP Rafi Chavez DO     Subjective: Tired.  In PRN  Dicyclomine HCl (BENTYL) tab 20 mg, 20 mg, Oral, QID PRN  acetaminophen (TYLENOL EXTRA STRENGTH) tab 500 mg, 500 mg, Oral, G9T PRN  folic acid (FOLVITE) tab 2 mg, 2 mg, Oral, Daily  HYDROcodone-acetaminophen (NORCO) 5-325 MG per tab 1 tablet, 1 tablet sclerae are anicteric; scalp is atrauamatic; no thyromegaly  Neck: JVP >10 cm  Cardiac: Regular rate and regular rhythm; soft early systolic murmur at the base.    Lungs: Clear to auscultation bilaterally; no accessory muscle use is noted  Abdomen: Soft, no vena cava: The respirophasic diameter changes were blunted,      which may suggest elevated right atrium pressure from volume overload. TTE 5/26/21:   Conclusions:   1. Left ventricle:  The cavity size was normal. Wall thickness was normal.      Systoli albumin, midodrine, suspect cardiorenal syndrome   - Echo with high filling pressures  2. Multivessel CAD - medically managed  3. Paroxysmal Atrial Fibrillation  4. H/o CVA  5. Lung CA stage 2B s/p resection and chemo  6. Rectal Bleeding  7.  JASON with hypon

## 2021-07-30 NOTE — PROGRESS NOTES
[facility]    Nephrology Progress Note    Lindsay French Attending:  Pau Reid MD       SUBJECTIVE:     She has nausea and decreased appetite  Discussed this could be related to renal failure  Creatinine has worsened despite dobutamine/bumex MCH 29.8 07/30/2021    MCHC 32.4 07/30/2021    RDW 19.8 07/30/2021    NEPRELIM 4.48 07/30/2021    NEUTABS 9.26 (H) 06/07/2021    LYMPHABS 0.86 (L) 06/07/2021    EOSABS 0.00 06/07/2021    BASABS 0.00 06/07/2021    NEUT 77 06/07/2021    LYMPH 7 06/07/2021 mg, 20 mg, Oral, QID PRN  acetaminophen (TYLENOL EXTRA STRENGTH) tab 500 mg, 500 mg, Oral, Z9C PRN  folic acid (FOLVITE) tab 2 mg, 2 mg, Oral, Daily  HYDROcodone-acetaminophen (NORCO) 5-325 MG per tab 1 tablet, 1 tablet, Oral, Q8H PRN  [Held by provider] m

## 2021-07-30 NOTE — PROGRESS NOTES
[facility] Hospitalist Progress Note                                                                   1 Gato Kramer  7/26/1945    INTERVAL HISTORY/SUBJECTIVE:   No chest pain, palpitations, 182* 280* 442* 301*   ALB 2.8* 3.5  3.4 4.2  4.2 3.7 4.0       No results for input(s): PGLU in the last 168 hours.     Meds:   Scheduled:   • Midodrine HCl  5 mg Oral TID   • ezetimibe  10 mg Oral Nightly   • PEG 3350  17 g Oral BID   • glycerin (laxative) eliquis and asa as long as plt < 50     Anemia--> stable  -no active bleeding currently.  -trend  - IV venofer per heme     GI Bleed  -no further episodes in the hospital  -pt eliquis and asa on hold  -resumed diet, monitor for reoccurence     Acute on Chr

## 2021-07-31 NOTE — PLAN OF CARE
Assumed care at 1900, pt resting in bed  A&Ox4  O2 sat WNL on RA-sating 93%  NSR on tele. Lovenox on hold d/t plt count. Toprol on hold per Dr. Leatha Ruelas  bumex gtt infusing per order. Dobutamine gtt infusing per order. Ojeda in place.  Purnima care and ojeda care Assess skin color and temperature  Outcome: Progressing  Goal: Absence of cardiac arrhythmias or at baseline  Description: INTERVENTIONS:  - Monitor vital signs, obtain 12 lead EKG if indicated  - Administer antiarrhythmic and heart rate control medication Progressing

## 2021-07-31 NOTE — PROGRESS NOTES
[facility] Cardiology  Progress Note      Baker Maicol Patient Status:  Observation    1945 MRN [id number]   [facility] 8NE-A Attending Howard Muhammad MD   [address] Day # 5 PCP Kristy Mahan DO     Subjective: Tired.  In QID PRN  acetaminophen (TYLENOL EXTRA STRENGTH) tab 500 mg, 500 mg, Oral, O9P PRN  folic acid (FOLVITE) tab 2 mg, 2 mg, Oral, Daily  HYDROcodone-acetaminophen (NORCO) 5-325 MG per tab 1 tablet, 1 tablet, Oral, Q8H PRN  [Held by provider] metoprolol succina thyromegaly  Neck: JVP >10 cm  Cardiac: Irreg, Irreg rate and regular rhythm; soft early systolic murmur at the base.    Lungs: Clear to auscultation bilaterally; no accessory muscle use is noted  Abdomen: Soft, non-tender; bowel sounds are normoactive; no segments are hypokinetic. Grade 2 diastolic dysfunction. 2. Right ventricle: The cavity size was mildly increased. Systolic function      was mildly reduced. 3. Left atrium: The left atrium was mildly to moderately dilated. 4. Mitral valve:  There was 95% ulcerated +/- dissected stenosis proximally     Impression:  Veronika Barrera is a 66year old female with a history of CAD, ischemic HFrEF, Afib, CVA, lung cancer who presents with volume overload, worsening renal failure and decompensated heart alcon

## 2021-07-31 NOTE — PROGRESS NOTES
[facility] Hospitalist Progress Note                                                                   1 Gato Kramer  7/26/1945    INTERVAL HISTORY/SUBJECTIVE:   No chest pain, palpitations, 156* 227* 194* 156*   * 280* 442* 301* 190*   ALB 3.5  3.4 4.2  4.2 3.7 4.0 3.8       No results for input(s): PGLU in the last 168 hours.     Meds:   Scheduled:   • PEG 3350  17 g Oral BID   • glycerin (laxative)  1 suppository Rectal Once   • folic currently.  -trend  - IV venofer per heme     GI Bleed  -no further episodes in the hospital  -pt eliquis and asa on hold  -resumed diet, monitor for reoccurence     Acute on Chronic Systolic HF  -I/o  -pt with maria m as well, given IVF in the ER, stopped 7/2

## 2021-07-31 NOTE — PLAN OF CARE
Plan for DC tomorrow due to meds, obtaining equipment, and RN staffing. Patient wants to go today but [facility] is saying tomorrow. Will let MD and consults aware of situation.   Problem: Patient/Family Goals  Goal: Patient/Family Long Term Goal  Descript Initiate emergency measures for life threatening arrhythmias  - Monitor electrolytes and administer replacement therapy as ordered  Outcome: Not Progressing     Problem: Gastrointestinal  Goal: Minimal or absence of nausea and/or vomiting  Description: INT

## 2021-07-31 NOTE — CM/SW NOTE
MSW met with pt and family to present hospice benefits. Pt signed her own consents. DC tomorrow 8/1/21. 2PM. Transport ordered, PCS form with home packet.  [facility] ordering DME,for home delivery tomorrow 8/1/21, and MEDS.   Saqib Stratton, KALLI  [facility]

## 2021-07-31 NOTE — PROGRESS NOTES
[facility]    Nephrology Progress Note    Annamarie Billings Attending:  Thu Camejo MD       SUBJECTIVE:     Amelia Lereuben doesn't feel great\"  Complains of some chest pressure  Legs are more heavy she feels  She really wants to go home today (H) 06/07/2021    LYMPHABS 0.86 (L) 06/07/2021    EOSABS 0.00 06/07/2021    BASABS 0.00 06/07/2021    NEUT 77 06/07/2021    LYMPH 7 06/07/2021    MON 16 06/07/2021    EOS 0 06/07/2021    BASO 0 06/07/2021    NEPERCENT 77.5 07/31/2021    LYPERCENT 13.3 07/3 metoprolol succinate (Toprol XL) 24 hr tab 50 mg, 50 mg, Oral, BID  Pantoprazole Sodium (PROTONIX) EC tab 40 mg, 40 mg, Oral, QAM AC  [Held by provider] Enoxaparin Sodium (LOVENOX) 30 MG/0.3ML injection 30 mg, 30 mg, Subcutaneous, Daily        ASSESSMENT/P

## 2021-07-31 NOTE — HOSPICE RN NOTE
[facility] nursing visit for follow up on hospice consult order. Joint visit with David MAHAN. Patient, spouse and 3 daughters at bedside. Discussed hospice benefit, hospice philosophy, palliative care, with questions and concerns addressed.  Patient

## 2021-08-01 NOTE — PLAN OF CARE
Problem: Patient/Family Goals  Goal: Patient/Family Long Term Goal  Description: Patient's Long Term Goal: discharge by 7/30/21    Interventions:  - MD rounding, consult rounding, monitor labs, pain control, medications  - See additional Care Plan goals for Discharge     Problem: Gastrointestinal  Goal: Minimal or absence of nausea and/or vomiting  Description: INTERVENTIONS AS NEEDED:  - Assess bowel function  - Administer ordered antiemetic medications as needed: see electronic medical record  - Provide

## 2021-08-01 NOTE — PLAN OF CARE
Assumed care of patient 7/31/21 1930. Pt is DNAR-Comfort, transitioning to home w/hospice 8/1 @ 1400. Bumex gtt stopped at 2100, IV removed - site leaking. Not replaced per pt request. Russo removed, 200ml present in bag when removed.  Purewic placed for pt increased pain with activity and pre-medicate as appropriate  Outcome: Adequate for Discharge     Problem: Cardiovascular  Goal: Maintains optimal cardiac output and hemodynamic stability  Description: INTERVENTIONS:  - Monitor vital signs and trends  - Ad Adequate for Discharge     Problem: GENITOURINARY - ADULT  Goal: Absence of urinary retention  Description: INTERVENTIONS:  - Assess patient’s ability to void and empty bladder  - Monitor intake/output and perform bladder scan as needed  - Follow urinary r

## 2021-08-01 NOTE — PROGRESS NOTES
[facility] Cardiology  Progress Note      Skinny Adair Patient Status:  Observation    1945 MRN [id number]   [facility] 8NE-A Attending Tre Mcgraw MD   [address] Day # 6 PCP Fouzia Ba DO     Subjective: Feels ok t Sodium (PROTONIX) EC tab 40 mg, 40 mg, Oral, QAM AC  [Held by provider] Enoxaparin Sodium (LOVENOX) 30 MG/0.3ML injection 30 mg, 30 mg, Subcutaneous, Daily      Past Medical History:   Diagnosis Date   • AAA (abdominal aortic aneurysm) ([facility]    • Back prob mood and affect; answers questions appropriately  Dermatologic: No rashes; normal skin turgor    Diagnostic testing:  Labs:   Lab Results   Component Value Date    INR 1.54 (H) 05/28/2021    INR 1.88 (H) 05/27/2021        Lab Results   Component Value Date valve: There was moderate regurgitation. 6. Inferior vena cava: The respirophasic diameter changes were blunted,      which may suggest elevated right atrium pressure from volume overload. TTE 5/26/21:   Conclusions:   1. Left ventricle:  The cavity s (LVEF 20-25%)   - Poor UOP despite high dose IV bumex, albumin, midodrine, suspect cardiorenal syndrome   - Echo with high filling pressures  2. Multivessel CAD - medically managed  3. Paroxysmal Atrial Fibrillation - with some RVR  4. H/o CVA  5.  Lung CA

## 2021-08-01 NOTE — PROGRESS NOTES
[facility]    Nephrology Progress Note    Fei Oropeza Attending:  Raquel Adams MD       SUBJECTIVE:     Her bumex gtt was stopped overnight. She has a low potassium today but feels ok and declines replacement.   Plans to go home with hospice 06/07/2021    EOSABS 0.00 06/07/2021    BASABS 0.00 06/07/2021    NEUT 77 06/07/2021    LYMPH 7 06/07/2021    MON 16 06/07/2021    EOS 0 06/07/2021    BASO 0 06/07/2021    NEPERCENT 77.5 07/31/2021    LYPERCENT 13.3 07/31/2021    MOPERCENT 8.2 07/31/2021 provider] Enoxaparin Sodium (LOVENOX) 30 MG/0.3ML injection 30 mg, 30 mg, Subcutaneous, Daily        ASSESSMENT/PLAN:       67 yo F with history of CKD stage 3 with baseline creatinine ~1.2- 1.3 mg/dl, HTN, HL, lung cancer s/p lobectomy and chemo, AAA, rec

## 2021-08-02 NOTE — DISCHARGE SUMMARY
[address] SUMMARY     Syed Chilel Patient Status:  Inpatient    1945 MRN [id number]   [facility] 8NE-A Attending No att. providers found   [address] Day # 6 PCP Justino Kaur DO     Date of Admission: 2021 (40 mg total) by mouth daily AND 0.5 tablets (20 mg total) every evening. Quantity: 60 tablet  Refills: 0     HYDROcodone-acetaminophen 5-325 MG Tabs  Commonly known as: Norco      Take 1 tablet by mouth every 8 (eight) hours as needed for Pain.    Stop t renal infarcts, Neuropathy, RA, hx CVA and lung cancer s/p resection and chemo presenting with acute on chronic abdominal pain with nausea and vomiting as well as possible blood in her stool.     Abdominal Pain --> resolved  Nausea and Vomiting --> resolve gi bleed     GI Bleed hx  -pantoprazole     RA  -norco po prn     COPD  -on room air at rest      HTN  -BP has been low, midodrine started  -metoprolol on hold     Lung Cancer  -s/p resection and chemo  -no further treatment per pt, last treatment was in [place]

## (undated) DEVICE — 1200CC GUARDIAN II: Brand: GUARDIAN

## (undated) DEVICE — CLIP LGT 11MM OPEN 2.8MM 235CM

## (undated) DEVICE — ENDOSCOPY PACK - LOWER: Brand: MEDLINE INDUSTRIES, INC.

## (undated) DEVICE — FIAPC® PROBE W/ FILTER 2200 A OD 2.3MM/6.9FR; L 2.2M/7.2FT: Brand: ERBE

## (undated) DEVICE — REM POLYHESIVE ADULT PATIENT RETURN ELECTRODE: Brand: VALLEYLAB

## (undated) DEVICE — 3M™ RED DOT™ MONITORING ELECTRODE WITH FOAM TAPE AND STICKY GEL, 50/BAG, 20/CASE, 72/PLT 2570: Brand: RED DOT™

## (undated) DEVICE — ENDOSCOPY PACK UPPER: Brand: MEDLINE INDUSTRIES, INC.

## (undated) DEVICE — FILTERLINE NASAL ADULT O2/CO2

## (undated) DEVICE — Device: Brand: DEFENDO AIR/WATER/SUCTION AND BIOPSY VALVE

## (undated) NOTE — LETTER
[facility] [address], [address]  Consent for Procedure/Sedation    Date: 5/27/2021    Time: 1635      1.  I authorize the performance upon Sharon Baxter the following:cardiac catheterization, left ventricular cineangiography Signature of person authorized                                     Relationship to  to consent for patient: _________________________ patient: ___________________    Witness: _______________________________ Date: _____________________    Printed: 5/27/2021

## (undated) NOTE — LETTER
[facility] [address], [address]  Consent for Procedure/Sedation    Date: 5/26/2021    Time: 2207      I authorize the performance upon Sharon Baxter the following:   upper endoscopy with possible control of bleeding, fahadati Name: Kristen Wright        : 1945       Medical Record #: [id number]

## (undated) NOTE — ED AVS SNAPSHOT
Kristen Wright   MRN: [id number]    Department:  [facility] Emergency Department   Date of Visit:  2/25/2020           Disclosure     Insurance plans vary and the physician(s) referred by the ER may not be covered by your plan.  Please contact tell this physician (or your personal doctor if your instructions are to return to your personal doctor) about any new or lasting problems. The primary care or specialist physician will see patients referred from the [facility] Emergency Department.  Melba Cohen

## (undated) NOTE — LETTER
Phoebe Fitzpatrick [address]  [address] [facility] [address], [address] [facility]  Authorization for Surgical Operation and Procedure     Date:___________                                                                                                         Time:__________ potential risks that can occur: fever and allergic reactions, hemolytic reactions, transmission of diseases such as Hepatitis, AIDS and Cytomegalovirus (CMV) and fluid overload.   In the event that I wish to have an autologous transfusion of my own blood, o will determine when the applicable recovery period ends for purposes of reinstating the DNAR order.   10. Patients having a sterilization procedure: I understand that if the procedure is successful the results will be permanent and it will therefore be impo (anesthesia doctor) to give me medicine and do additional procedures as necessary.  Some examples are: Starting or using an “IV” to give me medicine, fluids or blood during my procedure, and having a breathing tube placed to help me breathe when I’m asleep blocks”): I understand that rare but potential complications include headache, bleeding, infection, seizure, irregular heart rhythms, and nerve injury.     I can change my mind about having anesthesia services at any time before I get the medicine.    ____

## (undated) NOTE — IP AVS SNAPSHOT
[address]            (For Outpatient Use Only) Initial Admit Date: 7/23/2021   Inpt/Obs Admit Date: Inpt: 7/26/21 / Obs: 07/24/21   Discharge Date:    Hospital Acct:  [de-identified]   MRN: [de-identified]   CSN: [id number]   CEID: [id number] Payor:  Plan:    Group Number:  Insurance Type:    Subscriber Name:  Subscriber :    Subscriber ID:  Pt Rel to Subscriber:    Hospital Account Financial Class: Medicare    2021

## (undated) NOTE — IP AVS SNAPSHOT
Patient Demographics     Address  [address] [address] [facility] [address] Phone  [phone] [facility])  [phone] (Work) *Preferred*  [phone] [facility]) E-mail Address  [email or web address]      Emergency Contact(s)     Name Relation Home Wo attend this appointment to make sure your symptoms are under control. 2. Your recommended sodium intake is 7052-8820 mg daily    3. Limit your fluid intake to no more than 2 liters or 64 ounces per day    4.  Some exercise and activity is important to h dose due: 8/2/21      Take 1 tablet (40 mg total) by mouth daily AND 0.5 tablets (20 mg total) every evening.    Nilo Ibarra MD         HYDROcodone-acetaminophen 5-325 MG Tabs  Commonly known as: Norco      Take 1 tablet by mouth every 8 (eight) hours as HEALTH)    Specimen Information    Type Source Collected On   Blood — 08/01/21 0556          Components    Component Value Reference Range Flag Lab   Glucose 103 70 - 99 mg/dL H [facility])   Sodium 134 136 - 145 mmol/L L [facility])   Potassium Sergey Jaime MD (Physician)         [facility]    History and Physical     Sullivan Frankel Patient Status:  Observation    1945 MRN [id number]   [facility] 8NE-A Attending Sergey Jaime MD   [facility] Day # 0 PCP Jostin Jarrell SURGERY      lower back   •       x2   • COLONOSCOPY N/A 2021    Procedure: COLONOSCOPY;  Surgeon: Lucero Forman MD;  Location: [address] [place] [address] ENDOSCOPY   • COLONOSCOPY     • KNEE SURGERY Left     knee scope cleaned up   • OTHER SURGICAL HISTORY Polo furosemide 40 MG Oral Tab, Take 1 tablet (40 mg total) by mouth daily AND 0.5 tablets (20 mg total) every evening., Disp: 60 tablet, Rfl: 0  apixaban 2.5 MG Oral Tab, Take 1 tablet (2.5 mg total) by mouth 2 (two) times daily. , Disp: 60 tablet, Rfl: 0  Meto strength intact, strength 4+/5 Right LE, chronic[MS. 4]  Musculoskeletal: Moves all extremities. Extremities:[MS.1] 2+ edema bilateral LE, no tenderness of the LE[MS.4]  Integument: No rashes or lesions. Psychiatric: Appropriate mood and affect.       Mikala -monitor hgb q 12 hrs    Elevated LFT  -etiology uncertain  -trend, if remains elevated tomorrow will further evaluate and consider GI eval    Acute on Chronic Systolic HF  -cardiology consulted  -I/o  -pt with maria m as well, given IVF overnight, held this Location [facility] 8NE-A Attending Vargas Cardenas MD   Hosp Day #[CS.1] 0[CS. 2] PCP[CS.1] Jennifer Arana DO[CS. 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 Her smoking use included cigarettes. She started smoking about 58 years ago. She has a 28.50 pack-year smoking history.  She has never used smokeless tobacco. She reports that she does not drink a positive for constipation   : retention    Physical Exam:[CS.1]  /62 (BP Location: Left arm)   Pulse 77   Temp 98.1 °F (36.7 °C) (Oral)   Resp 18   Ht 5' 3\" (1.6 m)   Wt 194 lb 1.6 oz (88 kg)   SpO2 95%   BMI 34.38 kg/m²[CS. 3]   General appearance fracture of L2 vertebra, initial encounter ([facility] [address]     Thrombocytopenia ([facility] [address]     Acute kidney injury ([facility] [address]     Gastrointestinal hemorrhage     Gastrointestinal hemorrhage, unspecified gastrointestinal hemorrhage type     Anemia, unspecified type     JASON (ac Author Type: Physical Therapy Assistant    Filed: 7/30/2021  1:56 PM Date of Service: 7/30/2021  9:08 AM Status: Addendum    : Marlena Kelly PTA (Physical Therapy Assistant)    Related Notes: Original Note by Marlena Kelly PTA (Physical T Celestone Soluspan 3mg  05/30/19     Covid-19 Pfizer 03/18/21     Covid-19 Pfizer 02/25/21     INFLUENZA 11/23/20     Pneumococcal (Prevnar 13) 07/01/16     Pneumovax 23 07/01/15       Multidisciplinary Problems     Active Goals        Problem: Patient/Fam